# Patient Record
Sex: MALE | Race: WHITE
[De-identification: names, ages, dates, MRNs, and addresses within clinical notes are randomized per-mention and may not be internally consistent; named-entity substitution may affect disease eponyms.]

---

## 2017-12-10 ENCOUNTER — HOSPITAL ENCOUNTER (INPATIENT)
Dept: HOSPITAL 10 - E/R | Age: 22
LOS: 5 days | Discharge: HOME | DRG: 493 | End: 2017-12-15
Attending: INTERNAL MEDICINE | Admitting: INTERNAL MEDICINE
Payer: COMMERCIAL

## 2017-12-10 VITALS
WEIGHT: 267.64 LBS | BODY MASS INDEX: 36.25 KG/M2 | WEIGHT: 267.64 LBS | HEIGHT: 72 IN | HEIGHT: 72 IN | BODY MASS INDEX: 36.25 KG/M2

## 2017-12-10 VITALS — DIASTOLIC BLOOD PRESSURE: 69 MMHG | SYSTOLIC BLOOD PRESSURE: 129 MMHG | RESPIRATION RATE: 20 BRPM

## 2017-12-10 VITALS — TEMPERATURE: 98.6 F

## 2017-12-10 DIAGNOSIS — E87.0: ICD-10-CM
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
## 2017-12-10 DIAGNOSIS — E66.9: ICD-10-CM

## 2017-12-10 DIAGNOSIS — E83.32: ICD-10-CM

## 2017-12-10 DIAGNOSIS — R50.82: ICD-10-CM

## 2017-12-10 DIAGNOSIS — D72.829: ICD-10-CM

## 2017-12-10 DIAGNOSIS — S82.252A: Primary | ICD-10-CM

## 2017-12-10 LAB
ANION GAP SERPL CALC-SCNC: 17 MMOL/L (ref 8–16)
BASOPHILS # BLD AUTO: 0 10^3/UL (ref 0–0.1)
BASOPHILS NFR BLD: 0.3 % (ref 0–2)
BUN SERPL-MCNC: 8 MG/DL (ref 7–20)
CALCIUM SERPL-MCNC: 9.7 MG/DL (ref 8.4–10.2)
CHLORIDE SERPL-SCNC: 104 MMOL/L (ref 97–110)
CO2 SERPL-SCNC: 29 MMOL/L (ref 21–31)
CREAT SERPL-MCNC: 0.86 MG/DL (ref 0.61–1.24)
EOSINOPHIL # BLD: 0.1 10^3/UL (ref 0–0.5)
EOSINOPHIL NFR BLD: 1.3 % (ref 0–7)
ERYTHROCYTE [DISTWIDTH] IN BLOOD BY AUTOMATED COUNT: 12.3 % (ref 11.5–14.5)
GLUCOSE SERPL-MCNC: 90 MG/DL (ref 70–220)
HCT VFR BLD CALC: 43.9 % (ref 42–52)
HGB BLD-MCNC: 14.3 G/DL (ref 14–18)
INR PPP: 1
LYMPHOCYTES # BLD AUTO: 2.6 10^3/UL (ref 0.8–2.9)
LYMPHOCYTES NFR BLD AUTO: 29.7 % (ref 15–51)
MCH RBC QN AUTO: 29.5 PG (ref 29–33)
MCHC RBC AUTO-ENTMCNC: 32.6 G/DL (ref 32–37)
MCV RBC AUTO: 90.7 FL (ref 82–101)
MONOCYTES # BLD: 0.6 10^3/UL (ref 0.3–0.9)
MONOCYTES NFR BLD: 7.3 % (ref 0–11)
NEUTROPHILS # BLD: 5.4 10^3/UL (ref 1.6–7.5)
NEUTROPHILS NFR BLD AUTO: 61.1 % (ref 39–77)
NRBC # BLD MANUAL: 0 10^3/UL (ref 0–0)
NRBC BLD AUTO-RTO: 0 /100WBC (ref 0–0)
PLATELET # BLD: 444 10^3/UL (ref 140–415)
PMV BLD AUTO: 10.1 FL (ref 7.4–10.4)
POTASSIUM SERPL-SCNC: 4.2 MMOL/L (ref 3.5–5.1)
PROTHROMBIN TIME: 13.3 SEC (ref 11.9–14.9)
PT RATIO: 1
RBC # BLD AUTO: 4.84 10^6/UL (ref 4.7–6.1)
SODIUM SERPL-SCNC: 146 MMOL/L (ref 135–144)
WBC # BLD AUTO: 8.8 10^3/UL (ref 4.8–10.8)

## 2017-12-10 PROCEDURE — 97110 THERAPEUTIC EXERCISES: CPT

## 2017-12-10 PROCEDURE — 73700 CT LOWER EXTREMITY W/O DYE: CPT

## 2017-12-10 PROCEDURE — 81001 URINALYSIS AUTO W/SCOPE: CPT

## 2017-12-10 PROCEDURE — 73560 X-RAY EXAM OF KNEE 1 OR 2: CPT

## 2017-12-10 PROCEDURE — 73610 X-RAY EXAM OF ANKLE: CPT

## 2017-12-10 PROCEDURE — 84100 ASSAY OF PHOSPHORUS: CPT

## 2017-12-10 PROCEDURE — 97530 THERAPEUTIC ACTIVITIES: CPT

## 2017-12-10 PROCEDURE — 85610 PROTHROMBIN TIME: CPT

## 2017-12-10 PROCEDURE — 83735 ASSAY OF MAGNESIUM: CPT

## 2017-12-10 PROCEDURE — 80048 BASIC METABOLIC PNL TOTAL CA: CPT

## 2017-12-10 PROCEDURE — 87040 BLOOD CULTURE FOR BACTERIA: CPT

## 2017-12-10 PROCEDURE — 97542 WHEELCHAIR MNGMENT TRAINING: CPT

## 2017-12-10 PROCEDURE — 97116 GAIT TRAINING THERAPY: CPT

## 2017-12-10 PROCEDURE — 85025 COMPLETE CBC W/AUTO DIFF WBC: CPT

## 2017-12-10 PROCEDURE — 96375 TX/PRO/DX INJ NEW DRUG ADDON: CPT

## 2017-12-10 PROCEDURE — 36415 COLL VENOUS BLD VENIPUNCTURE: CPT

## 2017-12-10 PROCEDURE — 82306 VITAMIN D 25 HYDROXY: CPT

## 2017-12-10 PROCEDURE — 90686 IIV4 VACC NO PRSV 0.5 ML IM: CPT

## 2017-12-10 PROCEDURE — 96374 THER/PROPH/DIAG INJ IV PUSH: CPT

## 2017-12-10 PROCEDURE — 80053 COMPREHEN METABOLIC PANEL: CPT

## 2017-12-10 PROCEDURE — 97162 PT EVAL MOD COMPLEX 30 MIN: CPT

## 2017-12-10 PROCEDURE — 71010: CPT

## 2017-12-10 NOTE — RADRPT
PROCEDURE: CT LEFT ANKLE

 

CLINICAL INDICATION:  Injury. Fracture.. 

 

TECHNIQUE:   CT scan of the left ankle was performed on a multi -slice scanner.  No IV contrast was 
administered.  Coronal and sagittal  reformatted images were obtained from the axial source images. 
The total exam DLP equals 793.33 mGy-cm. The CDTI volume was 18.42 mGy.

 

One or more of the following dose reduction techniques were used:

 

- Automated exposure control.

- Adjustment of the mA and/or kV according to patient size .

- Use of iterative reconstruction technique. 

Images were reviewed on a high-resolution PACS workstation.    

 

DICOM images are available.

 

COMPARISON:   None. 

 

FINDINGS:

 

There is a comminuted intra-articular displaced left distal tibial fracture. There is a vertical spl
it component with the separation of 2 cm seen on sagittal image number 77. The posterior aspect of t
he tibia is displaced 16 mm posteriorly. There is a 2.6 cm butterfly fragment medially. There are in
tra-articular fragments. No fibular component is identified. No evidence for talar dome osteochondra
l defect. No evidence for calcaneal fracture. No evidence for midfoot or forefoot fracture. There is
 surrounding soft tissue swelling. 

 

 

IMPRESSION:

 

1.  Comminuted intra-articular displaced left distal tibial fracture with posterior displacement of 
the majority of the articular surface.

2.  There are intra-articular bony fragments.

 

RPTAT: XX

_____________________________________________ 

.Nick Garcia MD, MD           Date    Time 

Electronically viewed and signed by .Nick Garcia MD, MD on 12/10/2017 18:46 

 

D:  12/10/2017 18:46  T:  12/10/2017 18:46

.T/

## 2017-12-10 NOTE — ERD
ER Documentation


Chief Complaint


Chief Complaint


Sent from MD for eval and possible admission





HPI


22-year-old man with a history of recent left ankle fracture referred here by 

his orthopedic surgeon for further imaging and possible surgical intervention.  

He fractured his left ankle about 3 weeks ago, it has been in a splint since 

then.  Patient describes mild discomfort to the left ankle, no paresis or 

paresthesias no discoloration of the toes.





ROS


All systems reviewed and are negative except as per history of present illness.





Medications


Home Meds


Reported Medications


Hydrocodone Bit-Acetaminophen (Hydrocodone Bit-APAP) 5-325MG Tablet, 1 TAB PO 

Q6H Y for PAIN, TAB


   12/10/17


Ibuprofen* (Ibuprofen*) 600 Mg Tablet, 600 MG PO Q6 Y for PAIN, TAB


   12/10/17





Allergies


Allergies:  


Coded Allergies:  


     No Known Allergy (Unverified , 12/10/17)





PMhx/Soc


Recent left ankle fracture


Medical and Surgical Hx:  pt denies Medical Hx, pt denies Surgical Hx


Hx Alcohol Use:  No


Hx Substance Use:  No


Hx Tobacco Use:  No


Smoking Status:  Never smoker





FmHx


Family History:  No diabetes





Physical Exam


Vitals





Vital Signs








  Date Time  Temp Pulse Resp B/P Pulse Ox O2 Delivery O2 Flow Rate FiO2


 


12/10/17 15:48 98.9 117 20 140/86 97   








Physical Exam


GENERAL: Well-developed, well-nourished, well-hydrated, in no apparent distress

, looks nontoxic in appearance


HEENT: Moist mucous membranes, pink conjunctiva, no cervical spine tenderness 

or step-off deformities, no goiter, no jaundice or icterus, extraocular 

movements intact without pain. No submandibular induration, and no pharyngeal 

erythema


NEURO: Alert and oriented 3, cranial nerves II through XII intact bilaterally, 

pupils equal round reactive to light, no focal deficits or facial asymmetry, 

sensation intact distally Strength 5/5 in upper and lower extremities 

bilaterally


CARDIAC: Regular rate and rhythm, no murmurs rubs or gallops


LUNGS: Clear bilaterally no wheezing crackles or stridor


ABDOMEN: Soft nontender, no guarding, no rigidity, no rebound, no psoas sign no 

obturator sign. Normoactive bowel sounds


SKIN: Warm and dry to touch, no abrasions, contusions, or hematomas, no 

lacerations, no ecchymosis, no target lesions, and without ulcers


EXTREMITIES: No clubbing cyanosis or edema, calves are bilaterally symmetrical, 

no Homans sign, no popliteal cord sign. Distal pulses equal and bilateral


PSYCH: Normal affect without agitation or irritability


Result Diagram:  


12/10/17 1720                                                                  

              12/10/17 1720





Results 24 hrs





 Laboratory Tests








Test


  12/10/17


17:20


 


White Blood Count 8.810^3/ul 


 


Red Blood Count 4.8410^6/ul 


 


Hemoglobin 14.3g/dl 


 


Hematocrit 43.9% 


 


Mean Corpuscular Volume 90.7fl 


 


Mean Corpuscular Hemoglobin 29.5pg 


 


Mean Corpuscular Hemoglobin


Concent 32.6g/dl 


 


 


Red Cell Distribution Width 12.3% 


 


Platelet Count 81414^3/UL 


 


Mean Platelet Volume 10.1fl 


 


Neutrophils % 61.1% 


 


Lymphocytes % 29.7% 


 


Monocytes % 7.3% 


 


Eosinophils % 1.3% 


 


Basophils % 0.3% 


 


Nucleated Red Blood Cells % 0.0/100WBC 


 


Neutrophils # 5.410^3/ul 


 


Lymphocytes # 2.610^3/ul 


 


Monocytes # 0.610^3/ul 


 


Eosinophils # 0.110^3/ul 


 


Basophils # 0.010^3/ul 


 


Nucleated Red Blood Cells # 0.010^3/ul 


 


Prothrombin Time 13.3Sec 


 


Prothrombin Time Ratio 1.0 


 


INR International Normalized


Ratio 1.00 


 


 


Sodium Level 146mmol/L 


 


Potassium Level 4.2mmol/L 


 


Chloride Level 104mmol/L 


 


Carbon Dioxide Level 29mmol/L 


 


Anion Gap 17 


 


Blood Urea Nitrogen 8mg/dl 


 


Creatinine 0.86mg/dl 


 


Glucose Level 90mg/dl 


 


Calcium Level 9.7mg/dl 








 Current Medications








 Medications


  (Trade)  Dose


 Ordered  Sig/Sp


 Route


 PRN Reason  Start Time


 Stop Time Status Last Admin


Dose Admin


 


 Sodium Chloride


  (NS)  1,000 ml @ 


 1,000 mls/hr  Q1H STAT


 IV


   12/10/17 17:09


 12/10/17 18:08 DC 12/10/17 17:29


 


 


 Morphine Sulfate


  (morphine)  4 mg  ONCE  STAT


 IV


   12/10/17 17:09


 12/10/17 17:12 DC 12/10/17 17:29


 


 


 Ondansetron HCl


  (Zofran Inj)  4 mg  ONCE  STAT


 IV


   12/10/17 17:09


 12/10/17 17:12 DC 12/10/17 17:29


 











Procedures/MDM


IV line was established patient was placed on cardiac monitor rhythm strip 

revealed a sinus rhythm at about 80 bpm with upright P and T waves.  Patient 

was afebrile





I administered 1 L normal saline intravenously, morphine 4 mg IV, Zofran 4 mg 

IV.





CT scan of the left ankle was performed IMPRESSION:


 


1.  Comminuted intra-articular displaced left distal tibial fracture with 

posterior displacement of the majority of the articular surface.


2.  There are intra-articular bony fragments.





CBC and electrolytes are normal, coagulation profile was normal.





I spoke to the patient's orthopedic surgeon, who recommended admission and 

n.p.o. after midnight for surgical intervention tomorrow morning.





Patient to be admitted to [facility] for continued medical management and 

orthopedic surgical intervention.





Departure


Diagnosis:  


 Primary Impression:  


 Closed left ankle fracture


 Encounter type:  initial encounter  Qualified Code:  S82.892A - Closed 

fracture of left ankle, initial encounter


Condition:  DWAYNE Fernandez MD Dec 10, 2017 17:23

## 2017-12-11 VITALS — SYSTOLIC BLOOD PRESSURE: 119 MMHG | DIASTOLIC BLOOD PRESSURE: 71 MMHG | RESPIRATION RATE: 20 BRPM | HEART RATE: 102 BPM

## 2017-12-11 VITALS — DIASTOLIC BLOOD PRESSURE: 66 MMHG | RESPIRATION RATE: 21 BRPM | HEART RATE: 104 BPM | SYSTOLIC BLOOD PRESSURE: 127 MMHG

## 2017-12-11 VITALS — DIASTOLIC BLOOD PRESSURE: 68 MMHG | HEART RATE: 108 BPM | RESPIRATION RATE: 21 BRPM | SYSTOLIC BLOOD PRESSURE: 121 MMHG

## 2017-12-11 VITALS — RESPIRATION RATE: 24 BRPM | SYSTOLIC BLOOD PRESSURE: 153 MMHG | HEART RATE: 118 BPM | DIASTOLIC BLOOD PRESSURE: 75 MMHG

## 2017-12-11 VITALS — DIASTOLIC BLOOD PRESSURE: 72 MMHG | HEART RATE: 116 BPM | RESPIRATION RATE: 23 BRPM | SYSTOLIC BLOOD PRESSURE: 134 MMHG

## 2017-12-11 VITALS — RESPIRATION RATE: 20 BRPM | DIASTOLIC BLOOD PRESSURE: 70 MMHG | HEART RATE: 110 BPM | SYSTOLIC BLOOD PRESSURE: 133 MMHG

## 2017-12-11 VITALS — HEART RATE: 108 BPM | SYSTOLIC BLOOD PRESSURE: 136 MMHG | RESPIRATION RATE: 21 BRPM | DIASTOLIC BLOOD PRESSURE: 64 MMHG

## 2017-12-11 VITALS — SYSTOLIC BLOOD PRESSURE: 122 MMHG | RESPIRATION RATE: 20 BRPM | DIASTOLIC BLOOD PRESSURE: 70 MMHG | HEART RATE: 104 BPM

## 2017-12-11 VITALS — RESPIRATION RATE: 20 BRPM | SYSTOLIC BLOOD PRESSURE: 138 MMHG | DIASTOLIC BLOOD PRESSURE: 74 MMHG | HEART RATE: 110 BPM

## 2017-12-11 VITALS — SYSTOLIC BLOOD PRESSURE: 117 MMHG | HEART RATE: 102 BPM | DIASTOLIC BLOOD PRESSURE: 72 MMHG | RESPIRATION RATE: 21 BRPM

## 2017-12-11 VITALS — HEART RATE: 110 BPM | RESPIRATION RATE: 24 BRPM | DIASTOLIC BLOOD PRESSURE: 73 MMHG | SYSTOLIC BLOOD PRESSURE: 148 MMHG

## 2017-12-11 VITALS — SYSTOLIC BLOOD PRESSURE: 120 MMHG | DIASTOLIC BLOOD PRESSURE: 68 MMHG | HEART RATE: 108 BPM | RESPIRATION RATE: 24 BRPM

## 2017-12-11 VITALS — RESPIRATION RATE: 14 BRPM | DIASTOLIC BLOOD PRESSURE: 65 MMHG | SYSTOLIC BLOOD PRESSURE: 112 MMHG

## 2017-12-11 VITALS — DIASTOLIC BLOOD PRESSURE: 69 MMHG | SYSTOLIC BLOOD PRESSURE: 127 MMHG | HEART RATE: 104 BPM | RESPIRATION RATE: 20 BRPM

## 2017-12-11 VITALS — DIASTOLIC BLOOD PRESSURE: 73 MMHG | RESPIRATION RATE: 19 BRPM | SYSTOLIC BLOOD PRESSURE: 118 MMHG | HEART RATE: 101 BPM

## 2017-12-11 VITALS — SYSTOLIC BLOOD PRESSURE: 114 MMHG | DIASTOLIC BLOOD PRESSURE: 65 MMHG | RESPIRATION RATE: 16 BRPM

## 2017-12-11 VITALS — SYSTOLIC BLOOD PRESSURE: 118 MMHG | DIASTOLIC BLOOD PRESSURE: 68 MMHG | RESPIRATION RATE: 20 BRPM

## 2017-12-11 VITALS — RESPIRATION RATE: 20 BRPM | DIASTOLIC BLOOD PRESSURE: 72 MMHG | SYSTOLIC BLOOD PRESSURE: 129 MMHG

## 2017-12-11 LAB
ALBUMIN SERPL-MCNC: 3.5 G/DL (ref 3.3–4.9)
ALBUMIN/GLOB SERPL: 1.12 {RATIO}
ALP SERPL-CCNC: 77 IU/L (ref 42–121)
ALT SERPL-CCNC: 52 IU/L (ref 13–69)
ANION GAP SERPL CALC-SCNC: 13 MMOL/L (ref 8–16)
AST SERPL-CCNC: 22 IU/L (ref 15–46)
BASOPHILS # BLD AUTO: 0 10^3/UL (ref 0–0.1)
BASOPHILS NFR BLD: 0.4 % (ref 0–2)
BILIRUB DIRECT SERPL-MCNC: 0 MG/DL (ref 0–0.2)
BILIRUB SERPL-MCNC: 0.4 MG/DL (ref 0.2–1.3)
BUN SERPL-MCNC: 6 MG/DL (ref 7–20)
CALCIUM SERPL-MCNC: 9.3 MG/DL (ref 8.4–10.2)
CHLORIDE SERPL-SCNC: 106 MMOL/L (ref 97–110)
CO2 SERPL-SCNC: 28 MMOL/L (ref 21–31)
CREAT SERPL-MCNC: 0.84 MG/DL (ref 0.61–1.24)
EOSINOPHIL # BLD: 0.2 10^3/UL (ref 0–0.5)
EOSINOPHIL NFR BLD: 2.4 % (ref 0–7)
ERYTHROCYTE [DISTWIDTH] IN BLOOD BY AUTOMATED COUNT: 12.1 % (ref 11.5–14.5)
GLOBULIN SER-MCNC: 3.1 G/DL (ref 1.3–3.2)
GLUCOSE SERPL-MCNC: 103 MG/DL (ref 70–220)
HCT VFR BLD CALC: 38.9 % (ref 42–52)
HGB BLD-MCNC: 12.6 G/DL (ref 14–18)
LYMPHOCYTES # BLD AUTO: 4 10^3/UL (ref 0.8–2.9)
LYMPHOCYTES NFR BLD AUTO: 47.9 % (ref 15–51)
MAGNESIUM SERPL-MCNC: 2 MG/DL (ref 1.7–2.5)
MCH RBC QN AUTO: 29.5 PG (ref 29–33)
MCHC RBC AUTO-ENTMCNC: 32.4 G/DL (ref 32–37)
MCV RBC AUTO: 91.1 FL (ref 82–101)
MONOCYTES # BLD: 0.7 10^3/UL (ref 0.3–0.9)
MONOCYTES NFR BLD: 8 % (ref 0–11)
NEUTROPHILS # BLD: 3.5 10^3/UL (ref 1.6–7.5)
NEUTROPHILS NFR BLD AUTO: 40.9 % (ref 39–77)
NRBC # BLD MANUAL: 0 10^3/UL (ref 0–0)
NRBC BLD AUTO-RTO: 0 /100WBC (ref 0–0)
PHOSPHATE SERPL-MCNC: 4.7 MG/DL (ref 2.5–4.9)
PLATELET # BLD: 354 10^3/UL (ref 140–415)
PMV BLD AUTO: 10.4 FL (ref 7.4–10.4)
POTASSIUM SERPL-SCNC: 3.8 MMOL/L (ref 3.5–5.1)
PROT SERPL-MCNC: 6.6 G/DL (ref 6.1–8.1)
RBC # BLD AUTO: 4.27 10^6/UL (ref 4.7–6.1)
SODIUM SERPL-SCNC: 143 MMOL/L (ref 135–144)
WBC # BLD AUTO: 8.4 10^3/UL (ref 4.8–10.8)

## 2017-12-11 PROCEDURE — 0QSH04Z REPOSITION LEFT TIBIA WITH INTERNAL FIXATION DEVICE, OPEN APPROACH: ICD-10-PCS | Performed by: ORTHOPAEDIC SURGERY

## 2017-12-11 RX ADMIN — FOLIC ACID SCH MLS/HR: 5 INJECTION, SOLUTION INTRAMUSCULAR; INTRAVENOUS; SUBCUTANEOUS at 00:23

## 2017-12-11 RX ADMIN — FOLIC ACID SCH MLS/HR: 5 INJECTION, SOLUTION INTRAMUSCULAR; INTRAVENOUS; SUBCUTANEOUS at 16:18

## 2017-12-11 RX ADMIN — DOCUSATE SODIUM AND SENNOSIDES SCH TAB: 8.6; 5 TABLET, FILM COATED ORAL at 20:35

## 2017-12-11 RX ADMIN — MORPHINE SULFATE SCH MG: 1 INJECTION, SOLUTION INTRAVENOUS at 15:58

## 2017-12-11 RX ADMIN — FOLIC ACID SCH MLS/HR: 5 INJECTION, SOLUTION INTRAMUSCULAR; INTRAVENOUS; SUBCUTANEOUS at 10:00

## 2017-12-11 NOTE — OPR
Date/Time of Note


Date/Time of Note


DATE: 12/11/17 


TIME: 15:29





Operative Report


Procedure Date:  Dec 11, 2017


Preoperative Diagnosis


Left ankle fracture dislocation with displaced distal tibial pilon fracture


Postoperative Diagnosis


Left ankle fracture dislocation with displaced distal tibial pilon fracture


Operation/Procedure Performed


Left ankle fracture dislocation displaced distal tibial pilon fracture open 

reduction internal fixation with allograft


Application of external fixator followed by removal of ex- fix


Surgeon


Brian Zhong MD


Assistant


None


Anesthesia Type:  general


Tourniquet Time:  130 min at 250 mmHg


Estimated Blood Loss:  50 - 100 ml's


Transfusion


   none


Specimen


none


Grafts/Implants


[place] Distal tibial pilon plate and screws with medial plate and screws


[employer] Elite Bone Graft


Complications


none


Pt Condition Post Procedure:  stable


Disposition:  PACU


Indications


Patient is a 22-year-old gentleman who sustained a left ankle fracture 

dislocation approximately 3 and half weeks ago.  He presented to my office 2 

days ago and his ankle was completely dislocated at the time of presentation if 

he arrived in a posterior mold splint.  He reported that the delay in 

presentation was due to insurance authorization issues.  Given the significant 

dislocation I recommend patient go to emergency room so we could proceed with 

urgent reduction and fixation.  Patient was admitted and given the extent of 

the fracture dislocation patient required open reduction internal fixation of 

the distal tibial pilon and fibular fracture.


Procedure Description


Risk Note: Patient was explained the risks and benefits of surgery and the 

patient's native language including not limited to infection, bleeding, injury 

to blood vessels, nerves, ligaments or tendons.  Risks of anesthesia, deep vein 

thrombosis and need for reduce future surgery.  Patient acknowledged these risk 

by signing the surgical consent form.





Modifier 22 note: Given the extensive length of time the patient's ankle was in 

a dislocated position this required an extraordinary amount of technical skill 

and time in the operating room to both obtain adequate length alignment and 

rotation.  This required applying an external fixator device in order to reduce 

the fracture and the pain length and alignment and reduction of the fracture.  

The external fixator had to be removed and the case but this added a 

significant time to the case and thus made the case extensively more 

challenging and technically difficult due to the length of time of the fracture 

being in the mal-reduced position.  Thus the case should be given a modifier 22 

compensation.








Operative note: Patient was met in the preoperative area and the correct 

operative extremity was confirmed with the patient and consent and marked 

accordingly.  Patient was then brought to the operative theater placed supine 

on operative table given preoperative antibiotics.


Patient was prepped and draped in normal sterile fashion.  A timeout was taken 

in all parties in the room agreed direct patient extremity and procedure.  

Attention was initially placed to reduce in the fracture dislocation given that 

the talus was posteriorly displaced on the tibia.  A external fixator was 

placed in a delta frame position with 2 Schanz pins proximally on the tibia 

followed by a calcaneal pin placed medial to lateral at the calcaneus.  The 

fracture was then distracted out to length using the external fixator device.  

This is confirmed on both AP lateral and oblique fluoroscopic position.  

Tourniquet was then brought up incision was made in the anterior lateral 

approach and care was taken to avoid any injury to the neurovascular 

structures.  Fracture was identified along the anterior lateral aspect of the 

distal fibula and there is extensive comminution and osteopenia seen at this 

fracture site.  The fracture was reduced and held in position with K wires.  An 

anterior lateral distal tibial pilon plate was then placed compressing the 

fracture and a lag screw is in place free of the fracture plate and further 

reduce the fracture fragment.  The [employer] Elite allograft was also placed in 

the site of bone loss to further obtain allograft assistance in the healing of 

the fracture.  The final fixation was then placed with screws both proximal and 

locking screws distally along the anterior surface of the distal tibia.  It was 

confirmed that the screws were not in the joint shown on both lateral and AP 

position.  Once the anterior lateral leg was placed and there is shown to be 

excellent reduction of the articular surface attention was then turned to the 

medial aspect of the ankle incision was made along the medial aspect and 

decision was performed fracture was identified hematoma was removed and 

fracture was further reduced and held in position with K wires and then fixated 

with the medial tibial plate.  The fracture was reduced and held in position 

with lag screw fixation and locking screw nonlocking screw fixation.  Once the 

fracture was gently reduced and with the AP, lateral and oblique position all 

wounds irrigated thoroughly and closed in layers with 0 Vicryl followed by 2-0 

Vicryl followed by 3-0 Vicryl and then 3-0 nylon in a running vertical mattress 

fashion.  All wounds are dressed Xeroform, antibiotic ointment 4 x 4's and 

placed in a well-padded short leg splint.  I then the case all lunges and 

needle counts were correct.  I then the case the pulses were 2+ and patient was 

taken to the PACU in stable condition.











BRIAN ZHONG MD Dec 11, 2017 15:29

## 2017-12-11 NOTE — HP
Date/Time of Note


Date/Time of Note


DATE: 12/11/17 


TIME: 05:18





Assessment/Plan


VTE Prophylaxis


VTE Prophylaxis Intervention:  SCD's





Lines/Catheters


IV Catheter Type (from Nrs):  Saline Lock


Urinary Cath still in place:  No





Assessment/Plan


Assessment/Plan





1.  Left distal tibial fracture


-Keep n.p.o. for possible surgical intervention in the morning


-Pain management





2.  Mild hypernatremia


-Check repeat a.m. lab





3.  Obesity, with a BMI of 36


- weight reduction advised





HPI/ROS


Admit Date/Time


Admit Date/Time


Dec 10, 2017 at 18:16





Hx of Present Illness





This is a 22-year-old man with no significant chronic medical history who is 

admitted for orthopedic intervention.  Patient fractured his left ankle about 3 

weeks ago and has been on a cast since then.  He was sent by his orthopedic 

surgeon for possible surgery in a.m. patient complains of some pain on left 

lower extremity otherwise denied shortness of breath, chest pain, fever/chills, 

nausea or vomiting. 


When he initially presented to the ER, BP was 140/86 with a heart rate of 117.  

Tachycardia has since resolved after a liter of NS and morphine.  Labs shows 

sodium of 146 otherwise CBC and BMP are within acceptable range.  CT of the 

left lower extremity showed comminuted intra-articular displaced left distal 

tibial fracture with posterior displacement of the majority of the articular 

surface and intra-articular bony fragments.





PMH/Family/Social


Social History


Smoking Status:  Never smoker





Exam/Review of Systems


Vital Signs


Vitals





 Vital Signs








  Date Time  Temp Pulse Resp B/P Pulse Ox O2 Delivery O2 Flow Rate FiO2


 


12/11/17 01:58 98.0 64 20 118/68 98   


 


12/10/17 20:30      Room Air  














 Intake and Output   


 


 12/10/17 12/10/17 12/11/17





 14:59 22:59 06:59


 


Intake Total   450 ml


 


Balance   450 ml











Exam


Constitutional:  alert, oriented, well developed


Head:  atraumatic, normocephalic


Eyes:  EOMI, PERRL


Respiratory:  clear to auscultation, normal air movement


Cardiovascular:  nl pulses, regular rate and rhythm


Gastrointestinal:  non-tender, soft


Extremities:  other (Left lower extremity in a splint)





Labs


Result Diagram:  


12/10/17 1720                                                                  

              12/10/17 1720








Medications


Medications





 Current Medications


Dextrose/Sodium Chloride (D5-1/2ns) 1,000 ml @  100 mls/hr Q10H IV  Last 

administered on 12/11/17at 00:23; Admin Dose 100 MLS/HR;  Start 12/11/17 at 00:

00


Morphine Sulfate (morphine) 4 mg Q4H  PRN IV PAIN;  Start 12/10/17 at 22:00


Ondansetron HCl (Zofran Inj) 4 mg Q6H  PRN IV NAUSEA AND/OR VOMITING;  Start 12/

10/17 at 22:00


Influenza Virus Vaccine (Fluzone) 0.5 ml ONCE ONCE IM* ;  Start 12/12/17 at 09:

00;  Stop 12/12/17 at 09:01











NATHALIE SUGGS MD Dec 11, 2017 05:21

## 2017-12-11 NOTE — PN
Date/Time of Note


Date/Time of Note


DATE: 12/11/17 


TIME: 08:59





Assessment/Plan


VTE Prophylaxis


VTE Prophylaxis Intervention:  SCD's





Lines/Catheters


IV Catheter Type (from Nrs):  Saline Lock


Urinary Cath still in place:  No





Assessment/Plan


Assessment/Plan





1.  Left distal tibial fracture


- emergent surgery ORIF today


-Pain management





2.  Mild hypernatremia: resolved





3.  Obesity, with a BMI of 36


- weight reduction advise reinforced





Resume supportive care post op / f/u surgical findings





Subjective


24 Hr Interval Summary


Free Text/Dictation


patient doing well, states pain is well controlled





Exam/Review of Systems


Vital Signs


Vitals





 Vital Signs








  Date Time  Temp Pulse Resp B/P Pulse Ox O2 Delivery O2 Flow Rate FiO2


 


12/11/17 07:22 97.9 71 14 112/65 99   


 


12/10/17 20:30      Room Air  














 Intake and Output   


 


 12/10/17 12/10/17 12/11/17





 15:00 23:00 07:00


 


Intake Total   450 ml


 


Balance   450 ml











Exam


Constitutional:  alert, oriented, well developed


Head:  atraumatic, normocephalic


Eyes:  EOMI, PERRL


Respiratory:  clear to auscultation, normal air movement


Cardiovascular:  nl pulses, regular rate and rhythm


Gastrointestinal:  non-tender, soft


Extremities:  other (Left lower extremity in a splint)





Results


Result Diagram:  


12/11/17 0505                                                                  

              12/11/17 0505





Results 24 hrs





Laboratory Tests








Test


  12/10/17


17:20 12/11/17


05:05


 


White Blood Count 8.8   8.4  


 


Red Blood Count 4.84   4.27  L


 


Hemoglobin 14.3   12.6  L


 


Hematocrit 43.9   38.9  L


 


Mean Corpuscular Volume 90.7   91.1  


 


Mean Corpuscular Hemoglobin 29.5   29.5  


 


Mean Corpuscular Hemoglobin


Concent 32.6  


  32.4  


 


 


Red Cell Distribution Width 12.3   12.1  


 


Platelet Count 444  H 354  #


 


Mean Platelet Volume 10.1   10.4  


 


Neutrophils % 61.1   40.9  


 


Lymphocytes % 29.7   47.9  


 


Monocytes % 7.3   8.0  


 


Eosinophils % 1.3   2.4  


 


Basophils % 0.3   0.4  


 


Nucleated Red Blood Cells % 0.0   0.0  


 


Neutrophils # 5.4   3.5  


 


Lymphocytes # 2.6   4.0  H


 


Monocytes # 0.6   0.7  


 


Eosinophils # 0.1   0.2  


 


Basophils # 0.0   0.0  


 


Nucleated Red Blood Cells # 0.0   0.0  


 


Prothrombin Time 13.3   


 


Prothrombin Time Ratio 1.0   


 


INR International Normalized


Ratio 1.00  


  


 


 


Sodium Level 146  H 143  


 


Potassium Level 4.2   3.8  


 


Chloride Level 104   106  


 


Carbon Dioxide Level 29   28  


 


Anion Gap 17  H 13  


 


Blood Urea Nitrogen 8   6  L


 


Creatinine 0.86   0.84  


 


Glucose Level 90   103  


 


Calcium Level 9.7   9.3  


 


Phosphorus Level  4.7  


 


Magnesium Level  2.0  


 


Total Bilirubin  0.4  


 


Direct Bilirubin  0.00  


 


Indirect Bilirubin  0.4  


 


Aspartate Amino Transf


(AST/SGOT) 


  22  


 


 


Alanine Aminotransferase


(ALT/SGPT) 


  52  


 


 


Alkaline Phosphatase  77  


 


Total Protein  6.6  


 


Albumin  3.5  


 


Globulin  3.10  


 


Albumin/Globulin Ratio  1.12  











Medications


Medications





 Current Medications


Dextrose/Sodium Chloride (D5-1/2ns) 1,000 ml @  100 mls/hr Q10H IV  Last 

administered on 12/11/17at 00:23; Admin Dose 100 MLS/HR;  Start 12/11/17 at 00:

00


Morphine Sulfate (morphine) 4 mg Q4H  PRN IV PAIN;  Start 12/10/17 at 22:00


Ondansetron HCl (Zofran Inj) 4 mg Q6H  PRN IV NAUSEA AND/OR VOMITING;  Start 12/

10/17 at 22:00


Influenza Virus Vaccine 0.5 ml 0.5 ml ONCE ONCE IM* ;  Start 12/12/17 at 09:00;

  Stop 12/12/17 at 09:01


Cefazolin Sodium/ Dextrose (Ancef 2 Gm/50 ml (Pmx)) 50 ml @  100 mls/hr PRE-OP 

ONCE IVPB ;  Start 12/11/17 at 09:00;  Stop 12/11/17 at 09:29











WILLIE BAI Dec 11, 2017 09:00

## 2017-12-11 NOTE — SIPON
Date/Time of Note


Date/Time of Note


DATE: 12/11/17 


TIME: 15:29





Operative Report


Preoperative Diagnosis


Left ankle fracture dislocation with displaced distal tibial pilon fracture


Postoperative Diagnosis


Left ankle fracture dislocation with displaced distal tibial pilon fracture


Operation/Procedure Performed


Left ankle fracture dislocation displaced distal tibial pilon fracture open 

reduction internal fixation with allograft


Application of external fixator followed by removal of ex- fix


Surgeon


Brian Zhong MD


First assist


None


Anesthesia:  general


Estimated blood loss:  50 - 100 ml's


Transfusion Required


   none


Specimen


none


Grafts/Implants


see operative note


Complications


none











BRIAN ZHONG MD Dec 11, 2017 15:29

## 2017-12-11 NOTE — CONS
Date/Time of Note


Date/Time of Note


DATE: 12/11/17 


TIME: 08:02





Assessment/Plan


Assessment/Plan


Additional Assessment/Plan


23 yo male with displaced left distal tibial pilon fracture dislocation


- add on for emergent surgery ORIF today


- NPO


- IVF


- NWB to the LLE





-Yoana ZHONG MD





Consultation Date/Type/Reason


Admit Date/Time


Dec 10, 2017 at 18:16


Date of Consultation:  Dec 11, 2017


Type of Consultation:  Orthopedic Surgery


Referring Provider:  NATHALIE SUGGS MD





Hx of Present Illness


Patient is a 22 year old male who sustained a distal tibial pilon fracture 3.5 

weeks ago. Due to insurance issues patient does not present for surgery until 

now. He was admitted through the ER yesterday and ct scan shows displaced  

ankle fracture dislocation. Patient requires urgent surgical fixation. Pain is 

moderately controlled





Past Medical History


Medical History:  no pertinent history





Past Surgical History


Past Surgical Hx:  no surgical history





Family History


Significant Family History:  no pertinent family hx





Social History


Alcohol Use:  none


Smoking Status:  Never smoker


Drug Use:  none





Exam/Review of Systems


Vital Signs


Vitals





 Vital Signs








  Date Time  Temp Pulse Resp B/P Pulse Ox O2 Delivery O2 Flow Rate FiO2


 


12/11/17 07:22 97.9 71 14 112/65 99   


 


12/10/17 20:30      Room Air  














 Intake and Output   


 


 12/10/17 12/10/17 12/11/17





 15:00 23:00 07:00


 


Intake Total   450 ml


 


Balance   450 ml











Exam


Musculoskeletal:  other (LLE/ Splint intact, toes wiggle, sens intact to lt 

touch to the m/l/p with slightly decr to the fdws and dorsum but intact, cr 

brisk)





Results


Result Diagram:  


12/11/17 0505                                                                  

              12/11/17 0505





Results 24 hrs





Laboratory Tests








Test


  12/10/17


17:20 12/11/17


05:05


 


White Blood Count 8.8   8.4  


 


Red Blood Count 4.84   4.27  L


 


Hemoglobin 14.3   12.6  L


 


Hematocrit 43.9   38.9  L


 


Mean Corpuscular Volume 90.7   91.1  


 


Mean Corpuscular Hemoglobin 29.5   29.5  


 


Mean Corpuscular Hemoglobin


Concent 32.6  


  32.4  


 


 


Red Cell Distribution Width 12.3   12.1  


 


Platelet Count 444  H 354  #


 


Mean Platelet Volume 10.1   10.4  


 


Neutrophils % 61.1   40.9  


 


Lymphocytes % 29.7   47.9  


 


Monocytes % 7.3   8.0  


 


Eosinophils % 1.3   2.4  


 


Basophils % 0.3   0.4  


 


Nucleated Red Blood Cells % 0.0   0.0  


 


Neutrophils # 5.4   3.5  


 


Lymphocytes # 2.6   4.0  H


 


Monocytes # 0.6   0.7  


 


Eosinophils # 0.1   0.2  


 


Basophils # 0.0   0.0  


 


Nucleated Red Blood Cells # 0.0   0.0  


 


Prothrombin Time 13.3   


 


Prothrombin Time Ratio 1.0   


 


INR International Normalized


Ratio 1.00  


  


 


 


Sodium Level 146  H 143  


 


Potassium Level 4.2   3.8  


 


Chloride Level 104   106  


 


Carbon Dioxide Level 29   28  


 


Anion Gap 17  H 13  


 


Blood Urea Nitrogen 8   6  L


 


Creatinine 0.86   0.84  


 


Glucose Level 90   103  


 


Calcium Level 9.7   9.3  


 


Phosphorus Level  4.7  


 


Magnesium Level  2.0  


 


Total Bilirubin  0.4  


 


Direct Bilirubin  0.00  


 


Indirect Bilirubin  0.4  


 


Aspartate Amino Transf


(AST/SGOT) 


  22  


 


 


Alanine Aminotransferase


(ALT/SGPT) 


  52  


 


 


Alkaline Phosphatase  77  


 


Total Protein  6.6  


 


Albumin  3.5  


 


Globulin  3.10  


 


Albumin/Globulin Ratio  1.12  











Medications


Medications





 Current Medications


Dextrose/Sodium Chloride (D5-1/2ns) 1,000 ml @  100 mls/hr Q10H IV  Last 

administered on 12/11/17at 00:23; Admin Dose 100 MLS/HR;  Start 12/11/17 at 00:

00


Morphine Sulfate (morphine) 4 mg Q4H  PRN IV PAIN;  Start 12/10/17 at 22:00


Ondansetron HCl (Zofran Inj) 4 mg Q6H  PRN IV NAUSEA AND/OR VOMITING;  Start 12/

10/17 at 22:00


Influenza Virus Vaccine (Fluzone) 0.5 ml ONCE ONCE IM* ;  Start 12/12/17 at 09:

00;  Stop 12/12/17 at 09:01











SELENA ZHONG MD Dec 11, 2017 08:11

## 2017-12-12 VITALS — RESPIRATION RATE: 14 BRPM | DIASTOLIC BLOOD PRESSURE: 59 MMHG | SYSTOLIC BLOOD PRESSURE: 112 MMHG

## 2017-12-12 VITALS — RESPIRATION RATE: 14 BRPM | SYSTOLIC BLOOD PRESSURE: 139 MMHG | DIASTOLIC BLOOD PRESSURE: 88 MMHG

## 2017-12-12 VITALS — RESPIRATION RATE: 18 BRPM | SYSTOLIC BLOOD PRESSURE: 125 MMHG | DIASTOLIC BLOOD PRESSURE: 75 MMHG

## 2017-12-12 VITALS — DIASTOLIC BLOOD PRESSURE: 72 MMHG | RESPIRATION RATE: 14 BRPM | SYSTOLIC BLOOD PRESSURE: 132 MMHG

## 2017-12-12 LAB
ANION GAP SERPL CALC-SCNC: 13 MMOL/L (ref 8–16)
BASOPHILS # BLD AUTO: 0 10^3/UL (ref 0–0.1)
BASOPHILS NFR BLD: 0.2 % (ref 0–2)
BUN SERPL-MCNC: 5 MG/DL (ref 7–20)
CALCIUM SERPL-MCNC: 9.2 MG/DL (ref 8.4–10.2)
CHLORIDE SERPL-SCNC: 104 MMOL/L (ref 97–110)
CO2 SERPL-SCNC: 29 MMOL/L (ref 21–31)
CREAT SERPL-MCNC: 0.88 MG/DL (ref 0.61–1.24)
EOSINOPHIL # BLD: 0 10^3/UL (ref 0–0.5)
EOSINOPHIL NFR BLD: 0.1 % (ref 0–7)
ERYTHROCYTE [DISTWIDTH] IN BLOOD BY AUTOMATED COUNT: 12.2 % (ref 11.5–14.5)
GLUCOSE SERPL-MCNC: 113 MG/DL (ref 70–220)
HCT VFR BLD CALC: 36.8 % (ref 42–52)
HGB BLD-MCNC: 12.1 G/DL (ref 14–18)
LYMPHOCYTES # BLD AUTO: 2.1 10^3/UL (ref 0.8–2.9)
LYMPHOCYTES NFR BLD AUTO: 15.9 % (ref 15–51)
MAGNESIUM SERPL-MCNC: 1.9 MG/DL (ref 1.7–2.5)
MCH RBC QN AUTO: 29.8 PG (ref 29–33)
MCHC RBC AUTO-ENTMCNC: 32.9 G/DL (ref 32–37)
MCV RBC AUTO: 90.6 FL (ref 82–101)
MONOCYTES # BLD: 1.4 10^3/UL (ref 0.3–0.9)
MONOCYTES NFR BLD: 10.6 % (ref 0–11)
NEUTROPHILS # BLD: 9.6 10^3/UL (ref 1.6–7.5)
NEUTROPHILS NFR BLD AUTO: 72.8 % (ref 39–77)
NRBC # BLD MANUAL: 0 10^3/UL (ref 0–0)
NRBC BLD AUTO-RTO: 0 /100WBC (ref 0–0)
PLATELET # BLD: 375 10^3/UL (ref 140–415)
PMV BLD AUTO: 10.3 FL (ref 7.4–10.4)
POTASSIUM SERPL-SCNC: 4.6 MMOL/L (ref 3.5–5.1)
RBC # BLD AUTO: 4.06 10^6/UL (ref 4.7–6.1)
SODIUM SERPL-SCNC: 141 MMOL/L (ref 135–144)
WBC # BLD AUTO: 13.2 10^3/UL (ref 4.8–10.8)

## 2017-12-12 RX ADMIN — MORPHINE SULFATE SCH MG: 1 INJECTION, SOLUTION INTRAVENOUS at 11:29

## 2017-12-12 RX ADMIN — DOCUSATE SODIUM AND SENNOSIDES SCH TAB: 8.6; 5 TABLET, FILM COATED ORAL at 20:41

## 2017-12-12 RX ADMIN — DOCUSATE SODIUM AND SENNOSIDES SCH TAB: 8.6; 5 TABLET, FILM COATED ORAL at 08:30

## 2017-12-12 RX ADMIN — RIVAROXABAN SCH MG: 10 TABLET, FILM COATED ORAL at 17:23

## 2017-12-12 NOTE — RADRPT
PROCEDURE:   XR Left Ankle. 

 

CLINICAL INDICATION:  increased pain

 

TECHNIQUE:   AP, oblique and lateral views of the ankle were performed. 

 

COMPARISON:   12/11/2017 

 

FINDINGS:

Images are obtained through a fiberglass splint which limits fine detail.

Fixation plates are seen stabilizing the distal lateral tibia and distal medial tibia to include the
 medial malleolus. A comminuted medial malleolar fracture is present. No additional fractures are cl
early identified.

No acute fracture is identified.

The angle mortise is intact.

The soft tissues are unremarkable. 

 

 

 

IMPRESSION:

Fixation plates are seen stabilizing the distal lateral tibia and distal medial tibia to include the
 medial malleolus. A comminuted medial malleolar fracture is present.

 

 

RPTAT:AAJJ

_____________________________________________ 

Physician Mickey           Date    Time 

Electronically viewed and signed by Elmer Lou Physician on 12/12/2017 12:49 

 

D:  12/12/2017 12:49  T:  12/12/2017 12:49

KATE/

## 2017-12-12 NOTE — PN
Date/Time of Note


Date/Time of Note


DATE: 12/12/17 


TIME: 12:34





Assessment/Plan


Lines/Catheters


IV Catheter Type (from Nrsg):  Peripheral IV


Elder in Place (from Nrsg):  No





Assessment/Plan


Chief Complaint/Hosp Course


POD #1 s/p


Left ankle fracture dislocation displaced distal tibial pilon fracture open 

reduction internal fixation with allograft


Application of external fixator followed by removal of ex- fix





- NWB to the LLE


- PT to  GT


- DC PCA


- PO Pain meds


- Ok to DC when ok with Primary





E Pacheco


Problems:  





Subjective


24 Hr Interval Summary


Pain is better controlled patient denies any fevers chills nausea or vomiting.


Feeding:  advancing diet


Pain Control:  mild





Exam/Review of Systems


Vital Signs


Vitals





 Vital Signs








  Date Time  Temp Pulse Resp B/P Pulse Ox O2 Delivery O2 Flow Rate FiO2


 


12/15/17 14:00 99.6 103 17 144/83 97   











Results


Result Diagram:  


12/15/17 0537                                                                  

              12/15/17 0537














SELENA ZHONG MD Dec 12, 2017 12:34


Result Diagram:  


12/12/17 0453                                                                  

              12/12/17 0453














SELENA ZHONG MD Dec 12, 2017 12:34

## 2017-12-12 NOTE — PN
Date/Time of Note


Date/Time of Note


DATE: 12/12/17 


TIME: 10:58





Assessment/Plan


VTE Prophylaxis


VTE Prophylaxis Intervention:  other (xarelto)





Lines/Catheters


IV Catheter Type (from Nrs):  Peripheral IV


Urinary Cath still in place:  No





Assessment/Plan


Assessment/Plan


1.  Left distal tibial fracture


(Comminuted intra-articular displaced left distal tibial fracture with 

posterior displacement of the majority of the articular surface.)


- s/p emergent ORIF


-on PCA morphine pump for pain with oral percocets and IV cefazolin per ortho


-Will order XRs to ensure continued satisfactory alignment / also notify ortho





2.  Mild hypernatremia: resolved





3.  Obesity, with a BMI of 36


- weight reduction advise reinforced





4. leucocytosis : likely stress related





Resume supportive care post op





Subjective


24 Hr Interval Summary


Free Text/Dictation


Patient is c/o pain which he describes as a twisting sensation in his mid leg  

which started after PT. he was able to ambulate with crutches without bearing 

weight on R leg. 


The morphine PCA is not really helping.





Exam/Review of Systems


Vital Signs


Vitals





 Vital Signs








  Date Time  Temp Pulse Resp B/P Pulse Ox O2 Delivery O2 Flow Rate FiO2


 


12/12/17 07:22 98.9 87 14 112/59 94   


 


12/11/17 16:00      Room Air  


 


12/11/17 15:19       6.0 














 Intake and Output   


 


 12/11/17 12/11/17 12/12/17





 15:00 23:00 07:00


 


Intake Total 2150 ml 300 ml 525 ml


 


Output Total  100 ml 


 


Balance 2150 ml 200 ml 525 ml











Exam


Constitutional:  alert


Psych:  anxiety


Eyes:  PERRL


ENMT:  mucosa pink and moist


Neck:  supple


Respiratory:  clear to auscultation


Cardiovascular:  regular rate and rhythm, 


   No murmurs/extra sounds


Gastrointestinal:  bowel sounds, non-tender, soft


Musculoskeletal:  other (R knee in cast fro toes to just below knee. Area 

around R knee is non tender nor inflammed nor obviously displaced)


Neurological:  nl mental status


Skin:  No rash or lesions





Results


Result Diagram:  


12/12/17 0453                                                                  

              12/12/17 0453





Results 24 hrs





Laboratory Tests








Test


  12/12/17


04:53


 


White Blood Count 13.2  #H


 


Red Blood Count 4.06  L


 


Hemoglobin 12.1  L


 


Hematocrit 36.8  L


 


Mean Corpuscular Volume 90.6  


 


Mean Corpuscular Hemoglobin 29.8  


 


Mean Corpuscular Hemoglobin


Concent 32.9  


 


 


Red Cell Distribution Width 12.2  


 


Platelet Count 375  


 


Mean Platelet Volume 10.3  


 


Neutrophils % 72.8  


 


Lymphocytes % 15.9  


 


Monocytes % 10.6  


 


Eosinophils % 0.1  


 


Basophils % 0.2  


 


Nucleated Red Blood Cells % 0.0  


 


Neutrophils # 9.6  H


 


Lymphocytes # 2.1  


 


Monocytes # 1.4  H


 


Eosinophils # 0.0  


 


Basophils # 0.0  


 


Nucleated Red Blood Cells # 0.0  


 


Sodium Level 141  


 


Potassium Level 4.6  


 


Chloride Level 104  


 


Carbon Dioxide Level 29  


 


Anion Gap 13  


 


Blood Urea Nitrogen 5  L


 


Creatinine 0.88  


 


Glucose Level 113  


 


Calcium Level 9.2  


 


Magnesium Level 1.9  











Medications


Medications





 Current Medications


Dextrose/Sodium Chloride (D5-1/2ns) 1,000 ml @  50 mls/hr Q20H IV  Last 

administered on 12/11/17at 16:18; Admin Dose 100 MLS/HR;  Start 12/11/17 at 00:

00


Ondansetron HCl (Zofran Inj) 4 mg Q6H  PRN IV NAUSEA AND/OR VOMITING Last 

administered on 12/11/17at 16:41; Admin Dose 4 MG;  Start 12/10/17 at 22:00


Senna/Docusate Sodium (Senokot-S) 1 tab BID PO  Last administered on 12/12/17at 

08:30; Admin Dose 1 TAB;  Start 12/11/17 at 21:00


Magnesium Hydroxide (Milk Of Mag) 30 ml BID  PRN PO CONSTIPATION;  Start 12/11/ 17 at 16:00


Oxycodone/ Acetaminophen (Percocet (5/ 325)) 2 tab Q4H  PRN PO PAIN Last 

administered on 12/12/17at 10:24; Admin Dose 2 TAB;  Start 12/11/17 at 16:00


Morphine Sulfate (morphine) 5 mg Q4H  PRN IV PAIN LEVEL 7-10;  Start 12/11/17 

at 16:00


Ondansetron HCl (Zofran Inj) 4 mg Q4H  PRN IV NAUSEA AND/OR VOMITING Last 

administered on 12/11/17at 20:35; Admin Dose 4 MG;  Start 12/11/17 at 16:00


Diphenhydramine HCl (Benadryl) 25 mg Q4H  PRN PO ITCHING;  Start 12/11/17 at 16:

00


Morphine Sulfate 1.5  MG DOSE 10 ... Q4PCA IV  Last administered on 12/11/17at 

15:58; Admin Dose 30 MG;  Start 12/11/17 at 16:00


Cefazolin Sodium/ Dextrose (Ancef 2 Gm/50 ml (Pmx)) 50 ml @  100 mls/hr Q8H 

IVPB  Last administered on 12/12/17at 08:30; Admin Dose 100 MLS/HR;  Start 12/11 /17 at 16:30;  Stop 12/13/17 at 08:59











WILLIE BAI Dec 12, 2017 11:07

## 2017-12-12 NOTE — RADRPT
PROCEDURE:   Left knee x-ray 

 

CLINICAL INDICATION:  increased pain

 

TECHNIQUE:   AP and lateral views of the knee were obtained. 

 

COMPARISON:   None 

 

FINDINGS:

There is normal mineralization.  

No fracture is identified.

Lucencies are seen in the proximal tibial diaphysis, likely related to previous hardware placement.

There are no significant degenerative changes.  

There is no joint effusion. 

There is no significant soft tissue swelling. 

 

IMPRESSION:

 

1.  No acute abnormalities are identified.

2.  Lucencies are seen in the proximal tibial diaphysis, likely related to previous hardware placeme
nt.

 

 

RPTAT:AAJJ

_____________________________________________ 

Physician Mickey           Date    Time 

Electronically viewed and signed by Physician Mickey on 12/12/2017 12:46 

 

D:  12/12/2017 12:46  T:  12/12/2017 12:46

/

## 2017-12-13 VITALS — RESPIRATION RATE: 20 BRPM | DIASTOLIC BLOOD PRESSURE: 91 MMHG | SYSTOLIC BLOOD PRESSURE: 158 MMHG

## 2017-12-13 VITALS — RESPIRATION RATE: 18 BRPM | DIASTOLIC BLOOD PRESSURE: 82 MMHG | SYSTOLIC BLOOD PRESSURE: 136 MMHG

## 2017-12-13 VITALS — RESPIRATION RATE: 14 BRPM | SYSTOLIC BLOOD PRESSURE: 131 MMHG | DIASTOLIC BLOOD PRESSURE: 75 MMHG

## 2017-12-13 VITALS — SYSTOLIC BLOOD PRESSURE: 133 MMHG | DIASTOLIC BLOOD PRESSURE: 71 MMHG | RESPIRATION RATE: 18 BRPM

## 2017-12-13 LAB
ADD UMIC: YES
ANION GAP SERPL CALC-SCNC: 14 MMOL/L (ref 8–16)
BASOPHILS # BLD AUTO: 0 10^3/UL (ref 0–0.1)
BASOPHILS NFR BLD: 0.1 % (ref 0–2)
BUN SERPL-MCNC: 6 MG/DL (ref 7–20)
CALCIUM SERPL-MCNC: 8.6 MG/DL (ref 8.4–10.2)
CHLORIDE SERPL-SCNC: 101 MMOL/L (ref 97–110)
CO2 SERPL-SCNC: 27 MMOL/L (ref 21–31)
COLOR UR: YELLOW
CREAT SERPL-MCNC: 0.8 MG/DL (ref 0.61–1.24)
EOSINOPHIL # BLD: 0 10^3/UL (ref 0–0.5)
EOSINOPHIL NFR BLD: 0.1 % (ref 0–7)
ERYTHROCYTE [DISTWIDTH] IN BLOOD BY AUTOMATED COUNT: 12.1 % (ref 11.5–14.5)
GLUCOSE SERPL-MCNC: 105 MG/DL (ref 70–220)
GLUCOSE UR STRIP-MCNC: NEGATIVE MG/DL
HCT VFR BLD CALC: 37.7 % (ref 42–52)
HGB BLD-MCNC: 12.3 G/DL (ref 14–18)
KETONES UR STRIP.AUTO-MCNC: (no result) MG/DL
LYMPHOCYTES # BLD AUTO: 2.5 10^3/UL (ref 0.8–2.9)
LYMPHOCYTES NFR BLD AUTO: 18 % (ref 15–51)
MCH RBC QN AUTO: 29.1 PG (ref 29–33)
MCHC RBC AUTO-ENTMCNC: 32.6 G/DL (ref 32–37)
MCV RBC AUTO: 89.3 FL (ref 82–101)
MONOCYTES # BLD: 1.3 10^3/UL (ref 0.3–0.9)
MONOCYTES NFR BLD: 9.3 % (ref 0–11)
NEUTROPHILS # BLD: 9.9 10^3/UL (ref 1.6–7.5)
NEUTROPHILS NFR BLD AUTO: 72.1 % (ref 39–77)
NITRITE UR QL STRIP.AUTO: NEGATIVE MG/DL
NRBC # BLD MANUAL: 0 10^3/UL (ref 0–0)
NRBC BLD AUTO-RTO: 0 /100WBC (ref 0–0)
PLATELET # BLD: 384 10^3/UL (ref 140–415)
PMV BLD AUTO: 10.1 FL (ref 7.4–10.4)
POTASSIUM SERPL-SCNC: 3.6 MMOL/L (ref 3.5–5.1)
RBC # BLD AUTO: 4.22 10^6/UL (ref 4.7–6.1)
RBC # UR AUTO: (no result) MG/DL
SODIUM SERPL-SCNC: 138 MMOL/L (ref 135–144)
UR ASCORBIC ACID: NEGATIVE MG/DL
UR BILIRUBIN (DIP): NEGATIVE MG/DL
UR CLARITY: CLEAR
UR PH (DIP): 6 (ref 5–9)
UR RBC: 2 /HPF (ref 0–5)
UR SPECIFIC GRAVITY (DIP): 1.01 (ref 1–1.03)
UR TOTAL PROTEIN (DIP): NEGATIVE MG/DL
UROBILINOGEN UR STRIP-ACNC: NEGATIVE MG/DL
WBC # BLD AUTO: 13.7 10^3/UL (ref 4.8–10.8)
WBC # UR STRIP: NEGATIVE LEU/UL

## 2017-12-13 RX ADMIN — RIVAROXABAN SCH MG: 10 TABLET, FILM COATED ORAL at 17:18

## 2017-12-13 RX ADMIN — HYDROMORPHONE HYDROCHLORIDE PRN MG: 1 INJECTION, SOLUTION INTRAMUSCULAR; INTRAVENOUS; SUBCUTANEOUS at 06:55

## 2017-12-13 RX ADMIN — DOCUSATE SODIUM AND SENNOSIDES SCH TAB: 8.6; 5 TABLET, FILM COATED ORAL at 20:37

## 2017-12-13 RX ADMIN — HYDROMORPHONE HYDROCHLORIDE PRN MG: 1 INJECTION, SOLUTION INTRAMUSCULAR; INTRAVENOUS; SUBCUTANEOUS at 03:57

## 2017-12-13 RX ADMIN — DOCUSATE SODIUM AND SENNOSIDES SCH TAB: 8.6; 5 TABLET, FILM COATED ORAL at 09:05

## 2017-12-13 NOTE — PN
Date/Time of Note


Date/Time of Note


DATE: 12/13/17 


TIME: 12:59





Assessment/Plan


VTE Prophylaxis


VTE Prophylaxis Intervention:  other (xarelto)





Lines/Catheters


IV Catheter Type (from Nrsg):  Saline Lock


Urinary Cath still in place:  No





Assessment/Plan


Assessment/Plan


1.  Left distal tibial fracture


(Comminuted intra-articular displaced left distal tibial fracture with 

posterior displacement of the majority of the articular surface.)


- s/p emergent ORIF


-on PCA morphine pump for pain with oral percocets and IV cefazolin per ortho


-XRs reviewed without concern for re-intervention





2.  Mild hypernatremia: resolved





3.  Obesity, with a BMI of 36


- weight reduction advise reinforced





4. Leukocytosis and Post op fever: uA and CXR negative so far, f/u blood 

cultures, continue abx per ortho / continue IS, titrate abx if blood cultures 

come back positive





Plan for d/c when fever free x24hr





Subjective


24 Hr Interval Summary


Free Text/Dictation


patient developed fever overnight, feels better now though. Pain in leg is gone 

now.





Exam/Review of Systems


Vital Signs


Vitals





 Vital Signs








  Date Time  Temp Pulse Resp B/P Pulse Ox O2 Delivery O2 Flow Rate FiO2


 


12/13/17 07:38 98.7 96 18 133/71 95   


 


12/11/17 16:00      Room Air  


 


12/11/17 15:19       6.0 














 Intake and Output   


 


 12/12/17 12/12/17 12/13/17





 14:59 22:59 06:59


 


Intake Total 1900 ml 2170 ml 670 ml


 


Output Total 1200 ml  720 ml


 


Balance 700 ml 2170 ml -50 ml











Exam


Constitutional:  alert


Psych:  calm


Eyes:  PERRL


ENMT:  mucosa pink and moist


Neck:  supple


Respiratory:  clear to auscultation


Cardiovascular:  regular rate and rhythm, 


   No murmurs/extra sounds


Gastrointestinal:  bowel sounds, non-tender, soft


Musculoskeletal:  other (R knee in cast fro toes to just below knee. Area 

around R knee is non tender nor inflammed nor obviously displaced)


Neurological:  nl mental status


Skin:  No rash or lesions





Results


Result Diagram:  


12/13/17 0350                                                                  

              12/13/17 0350





Results 24 hrs





Laboratory Tests








Test


  12/13/17


03:11 12/13/17


03:50


 


Urine Color YELLOW   


 


Urine Clarity CLEAR   


 


Urine pH 6.0   


 


Urine Specific Gravity 1.014   


 


Urine Ketones TRACE  A 


 


Urine Nitrite NEGATIVE   


 


Urine Bilirubin NEGATIVE   


 


Urine Urobilinogen NEGATIVE   


 


Urine Leukocyte Esterase NEGATIVE   


 


Urine Microscopic RBC 2   


 


Urine Microscopic WBC 1   


 


Urine Hemoglobin 1+  H 


 


Urine Glucose NEGATIVE   


 


Urine Total Protein NEGATIVE   


 


White Blood Count  13.7  H


 


Red Blood Count  4.22  L


 


Hemoglobin  12.3  L


 


Hematocrit  37.7  L


 


Mean Corpuscular Volume  89.3  


 


Mean Corpuscular Hemoglobin  29.1  


 


Mean Corpuscular Hemoglobin


Concent 


  32.6  


 


 


Red Cell Distribution Width  12.1  


 


Platelet Count  384  


 


Mean Platelet Volume  10.1  


 


Neutrophils %  72.1  


 


Lymphocytes %  18.0  


 


Monocytes %  9.3  


 


Eosinophils %  0.1  


 


Basophils %  0.1  


 


Nucleated Red Blood Cells %  0.0  


 


Neutrophils #  9.9  H


 


Lymphocytes #  2.5  


 


Monocytes #  1.3  H


 


Eosinophils #  0.0  


 


Basophils #  0.0  


 


Nucleated Red Blood Cells #  0.0  


 


Sodium Level  138  


 


Potassium Level  3.6  


 


Chloride Level  101  


 


Carbon Dioxide Level  27  


 


Anion Gap  14  


 


Blood Urea Nitrogen  6  L


 


Creatinine  0.80  


 


Glucose Level  105  


 


Calcium Level  8.6  











Medications


Medications





 Current Medications


Ondansetron HCl (Zofran Inj) 4 mg Q6H  PRN IV NAUSEA AND/OR VOMITING Last 

administered on 12/11/17at 16:41; Admin Dose 4 MG;  Start 12/10/17 at 22:00


Senna/Docusate Sodium (Senokot-S) 1 tab BID PO  Last administered on 12/13/17at 

09:05; Admin Dose 1 TAB;  Start 12/11/17 at 21:00


Magnesium Hydroxide (Milk Of Mag) 30 ml BID  PRN PO CONSTIPATION;  Start 12/11/ 17 at 16:00


Ondansetron HCl (Zofran Inj) 4 mg Q4H  PRN IV NAUSEA AND/OR VOMITING Last 

administered on 12/11/17at 20:35; Admin Dose 4 MG;  Start 12/11/17 at 16:00


Diphenhydramine HCl (Benadryl) 25 mg Q4H  PRN PO ITCHING;  Start 12/11/17 at 16:

00


Oxycodone HCl (Roxicodone) 10 mg Q4H PO  Last administered on 12/13/17at 12:43; 

Admin Dose 10 MG;  Start 12/12/17 at 13:00


Acetaminophen (Tylenol Tab) 500 mg Q6H  PRN PO PAIN AND OR ELEVATED TEMP Last 

administered on 12/13/17at 03:57; Admin Dose 500 MG;  Start 12/12/17 at 13:00


Hydromorphone HCl (Dilaudid) 1 mg Q3H  PRN IV PAIN LEVEL 8-10 Last administered 

on 12/13/17at 06:55; Admin Dose 1 MG;  Start 12/12/17 at 13:00











WILLIE BAI Dec 13, 2017 13:02

## 2017-12-13 NOTE — HP
Date/Time of Note


Date/Time of Note


DATE: 12/13/17 


TIME: 10:33





Assessment/Plan


Lines/Catheters


IV Catheter Type (from Nrsg):  Saline Lock


Urinary Cath still in place:  No





Assessment/Plan


Assessment/Plan


1.  Left distal tibial fracture


(Comminuted intra-articular displaced left distal tibial fracture with 

posterior displacement of the majority of the articular surface.)


- s/p emergent ORIF


-on PCA morphine pump for pain with oral percocets and IV cefazolin per ortho


-Will order XRs to ensure continued satisfactory alignment / also notify ortho





2.  Mild hypernatremia: resolved





3.  Obesity, with a BMI of 36


- weight reduction advise reinforced





4. leucocytosis : likely stress related





5. Post op fever





HPI/ROS


Admit Date/Time


Admit Date/Time


Dec 10, 2017 at 18:16





Hx of Present Illness


patient developed fever overnight





ROS


Psychological:  anxiety





PMH/Family/Social


Past Medical History


Medical History:  no pertinent history





Past Surgical History


Past Surgical Hx:  no surgical history





Social History


Alcohol Use:  none


Smoking Status:  Never smoker


Drug Use:  none





Exam/Review of Systems


Vital Signs


Vitals





 Vital Signs








  Date Time  Temp Pulse Resp B/P Pulse Ox O2 Delivery O2 Flow Rate FiO2


 


12/13/17 07:38 98.7 96 18 133/71 95   


 


12/11/17 16:00      Room Air  


 


12/11/17 15:19       6.0 














 Intake and Output   


 


 12/12/17 12/12/17 12/13/17





 14:59 22:59 06:59


 


Intake Total 1900 ml 2170 ml 670 ml


 


Output Total 1200 ml  720 ml


 


Balance 700 ml 2170 ml -50 ml











Labs


Result Diagram:  


12/13/17 0350                                                                  

              12/13/17 0350








Medications


Medications





 Current Medications


Ondansetron HCl (Zofran Inj) 4 mg Q6H  PRN IV NAUSEA AND/OR VOMITING Last 

administered on 12/11/17at 16:41; Admin Dose 4 MG;  Start 12/10/17 at 22:00


Senna/Docusate Sodium (Senokot-S) 1 tab BID PO  Last administered on 12/13/17at 

09:05; Admin Dose 1 TAB;  Start 12/11/17 at 21:00


Magnesium Hydroxide (Milk Of Mag) 30 ml BID  PRN PO CONSTIPATION;  Start 12/11/ 17 at 16:00


Ondansetron HCl (Zofran Inj) 4 mg Q4H  PRN IV NAUSEA AND/OR VOMITING Last 

administered on 12/11/17at 20:35; Admin Dose 4 MG;  Start 12/11/17 at 16:00


Diphenhydramine HCl (Benadryl) 25 mg Q4H  PRN PO ITCHING;  Start 12/11/17 at 16:

00


Oxycodone HCl (Roxicodone) 10 mg Q4H PO  Last administered on 12/13/17at 09:05; 

Admin Dose 10 MG;  Start 12/12/17 at 13:00


Acetaminophen (Tylenol Tab) 500 mg Q6H  PRN PO PAIN AND OR ELEVATED TEMP Last 

administered on 12/13/17at 03:57; Admin Dose 500 MG;  Start 12/12/17 at 13:00


Hydromorphone HCl (Dilaudid) 1 mg Q3H  PRN IV PAIN LEVEL 8-10 Last administered 

on 12/13/17at 06:55; Admin Dose 1 MG;  Start 12/12/17 at 13:00











WILLIE BAI Dec 13, 2017 10:33

## 2017-12-14 VITALS — SYSTOLIC BLOOD PRESSURE: 123 MMHG | RESPIRATION RATE: 20 BRPM | DIASTOLIC BLOOD PRESSURE: 77 MMHG

## 2017-12-14 VITALS — RESPIRATION RATE: 18 BRPM | SYSTOLIC BLOOD PRESSURE: 135 MMHG | DIASTOLIC BLOOD PRESSURE: 71 MMHG

## 2017-12-14 VITALS — SYSTOLIC BLOOD PRESSURE: 132 MMHG | RESPIRATION RATE: 20 BRPM | DIASTOLIC BLOOD PRESSURE: 72 MMHG

## 2017-12-14 VITALS — SYSTOLIC BLOOD PRESSURE: 133 MMHG | DIASTOLIC BLOOD PRESSURE: 79 MMHG | RESPIRATION RATE: 20 BRPM

## 2017-12-14 LAB
ANION GAP SERPL CALC-SCNC: 16 MMOL/L (ref 8–16)
BASOPHILS # BLD AUTO: 0 10^3/UL (ref 0–0.1)
BASOPHILS NFR BLD: 0.2 % (ref 0–2)
BUN SERPL-MCNC: 8 MG/DL (ref 7–20)
CALCIUM SERPL-MCNC: 9.1 MG/DL (ref 8.4–10.2)
CHLORIDE SERPL-SCNC: 98 MMOL/L (ref 97–110)
CO2 SERPL-SCNC: 30 MMOL/L (ref 21–31)
CREAT SERPL-MCNC: 0.88 MG/DL (ref 0.61–1.24)
EOSINOPHIL # BLD: 0.1 10^3/UL (ref 0–0.5)
EOSINOPHIL NFR BLD: 0.6 % (ref 0–7)
ERYTHROCYTE [DISTWIDTH] IN BLOOD BY AUTOMATED COUNT: 12.3 % (ref 11.5–14.5)
GLUCOSE SERPL-MCNC: 99 MG/DL (ref 70–220)
HCT VFR BLD CALC: 36.3 % (ref 42–52)
HGB BLD-MCNC: 12.3 G/DL (ref 14–18)
LYMPHOCYTES # BLD AUTO: 3 10^3/UL (ref 0.8–2.9)
LYMPHOCYTES NFR BLD AUTO: 23.3 % (ref 15–51)
MCH RBC QN AUTO: 30.1 PG (ref 29–33)
MCHC RBC AUTO-ENTMCNC: 33.9 G/DL (ref 32–37)
MCV RBC AUTO: 89 FL (ref 82–101)
MONOCYTES # BLD: 1.3 10^3/UL (ref 0.3–0.9)
MONOCYTES NFR BLD: 9.9 % (ref 0–11)
NEUTROPHILS # BLD: 8.3 10^3/UL (ref 1.6–7.5)
NEUTROPHILS NFR BLD AUTO: 65.4 % (ref 39–77)
NRBC # BLD MANUAL: 0 10^3/UL (ref 0–0)
NRBC BLD AUTO-RTO: 0 /100WBC (ref 0–0)
PLATELET # BLD: 371 10^3/UL (ref 140–415)
PMV BLD AUTO: 9.9 FL (ref 7.4–10.4)
POTASSIUM SERPL-SCNC: 3.6 MMOL/L (ref 3.5–5.1)
RBC # BLD AUTO: 4.08 10^6/UL (ref 4.7–6.1)
SODIUM SERPL-SCNC: 140 MMOL/L (ref 135–144)
WBC # BLD AUTO: 12.6 10^3/UL (ref 4.8–10.8)

## 2017-12-14 RX ADMIN — DOCUSATE SODIUM AND SENNOSIDES SCH TAB: 8.6; 5 TABLET, FILM COATED ORAL at 20:55

## 2017-12-14 RX ADMIN — RIVAROXABAN SCH MG: 10 TABLET, FILM COATED ORAL at 17:11

## 2017-12-14 RX ADMIN — DOCUSATE SODIUM AND SENNOSIDES SCH TAB: 8.6; 5 TABLET, FILM COATED ORAL at 08:57

## 2017-12-14 NOTE — PN
Date/Time of Note


Date/Time of Note


DATE: 12/14/17 


TIME: 09:11





Assessment/Plan


VTE Prophylaxis


VTE Prophylaxis Intervention:  other (xarelto)





Lines/Catheters


IV Catheter Type (from Nrsg):  Saline Lock


Urinary Cath still in place:  No





Assessment/Plan


Assessment/Plan


1.  Left distal tibial fracture


(Comminuted intra-articular displaced left distal tibial fracture with 

posterior displacement of the majority of the articular surface.)


- s/p emergent ORIF


-on PCA morphine pump for pain with oral percocets and IV cefazolin per ortho


-XRs reviewed without concern for re-intervention





2.  Mild hypernatremia: resolved





3.  Obesity, with a BMI of 36


- weight reduction advise reinforced





4. Leukocytosis and Post op fever: uA and CXR negative so far, f/u blood 

cultures, continue abx per ortho / continue IS, titrate abx if blood cultures 

come back positive





Plan for d/c when fever free x24hr


The majority of healthy adults with vitamin D deficiency (eg, serum 25-

hydroxyvitamin D [25(OH)D] 10 to 20 ng/mL [25 to 50 nmol/L]) do not require any 

additional evaluation





Subjective


24 Hr Interval Summary


Free Text/Dictation


still having low grade fevers





Exam/Review of Systems


Vital Signs


Vitals





 Vital Signs








  Date Time  Temp Pulse Resp B/P Pulse Ox O2 Delivery O2 Flow Rate FiO2


 


12/14/17 07:48 99.3 100 18 135/71 95   


 


12/11/17 16:00      Room Air  


 


12/11/17 15:19       6.0 














 Intake and Output   


 


 12/13/17 12/13/17 12/14/17





 15:00 23:00 07:00


 


Intake Total 50 ml 1280 ml 700 ml


 


Output Total  1600 ml 


 


Balance 50 ml -320 ml 700 ml











Exam


Constitutional:  alert


Psych:  calm


Eyes:  PERRL


ENMT:  mucosa pink and moist


Neck:  supple


Respiratory:  clear to auscultation


Cardiovascular:  regular rate and rhythm, 


   No murmurs/extra sounds


Gastrointestinal:  bowel sounds, non-tender, soft


Musculoskeletal:  other (R knee in cast from toes to just below knee. Area 

around R knee remains non tender nor inflammed nor obviously displaced)


Neurological:  nl mental status


Skin:  No rash or lesions





Results


Result Diagram:  


12/14/17 0521 12/14/17 0521





Results 24 hrs





Laboratory Tests








Test


  12/14/17


05:21


 


White Blood Count 12.6  H


 


Red Blood Count 4.08  L


 


Hemoglobin 12.3  L


 


Hematocrit 36.3  L


 


Mean Corpuscular Volume 89.0  


 


Mean Corpuscular Hemoglobin 30.1  


 


Mean Corpuscular Hemoglobin


Concent 33.9  


 


 


Red Cell Distribution Width 12.3  


 


Platelet Count 371  


 


Mean Platelet Volume 9.9  


 


Neutrophils % 65.4  


 


Lymphocytes % 23.3  


 


Monocytes % 9.9  


 


Eosinophils % 0.6  


 


Basophils % 0.2  


 


Nucleated Red Blood Cells % 0.0  


 


Neutrophils # 8.3  H


 


Lymphocytes # 3.0  H


 


Monocytes # 1.3  H


 


Eosinophils # 0.1  


 


Basophils # 0.0  


 


Nucleated Red Blood Cells # 0.0  


 


Sodium Level 140  


 


Potassium Level 3.6  


 


Chloride Level 98  


 


Carbon Dioxide Level 30  


 


Anion Gap 16  


 


Blood Urea Nitrogen 8  


 


Creatinine 0.88  


 


Glucose Level 99  


 


Calcium Level 9.1  











Medications


Medications





 Current Medications


Ondansetron HCl (Zofran Inj) 4 mg Q6H  PRN IV NAUSEA AND/OR VOMITING Last 

administered on 12/11/17at 16:41; Admin Dose 4 MG;  Start 12/10/17 at 22:00


Senna/Docusate Sodium (Senokot-S) 1 tab BID PO  Last administered on 12/14/17at 

08:57; Admin Dose 1 TAB;  Start 12/11/17 at 21:00


Magnesium Hydroxide (Milk Of Mag) 30 ml BID  PRN PO CONSTIPATION Last 

administered on 12/14/17at 08:57; Admin Dose 30 ML;  Start 12/11/17 at 16:00


Ondansetron HCl (Zofran Inj) 4 mg Q4H  PRN IV NAUSEA AND/OR VOMITING Last 

administered on 12/11/17at 20:35; Admin Dose 4 MG;  Start 12/11/17 at 16:00


Diphenhydramine HCl (Benadryl) 25 mg Q4H  PRN PO ITCHING;  Start 12/11/17 at 16:

00


Oxycodone HCl (Roxicodone) 10 mg Q4H PO  Last administered on 12/14/17at 08:58; 

Admin Dose 10 MG;  Start 12/12/17 at 13:00


Acetaminophen (Tylenol Tab) 500 mg Q6H  PRN PO PAIN AND OR ELEVATED TEMP Last 

administered on 12/13/17at 03:57; Admin Dose 500 MG;  Start 12/12/17 at 13:00


Hydromorphone HCl (Dilaudid) 1 mg Q3H  PRN IV PAIN LEVEL 8-10 Last administered 

on 12/13/17at 06:55; Admin Dose 1 MG;  Start 12/12/17 at 13:00











WILLIE BAI Dec 14, 2017 09:12

## 2017-12-14 NOTE — DS
Date/Time of Note


Date/Time of Note


DATE: 12/14/17 


TIME: 12:29





Discharge Summary


Admission/Discharge Info


Admit Date/Time


Dec 10, 2017 at 18:16


Discharge Date/Time





12/14/17


.


Discharge Diagnosis


1.  Left distal tibial fracture


(Comminuted intra-articular displaced left distal tibial fracture with 

posterior displacement of the majority of the articular surface.)


- s/p emergent ORIF 12/11/17





2.  Mild hypernatremia: resolved





3.  Obesity, with a BMI of 36


- weight reduction advised and  reinforced





4. Leukocytosis and Post op fever: resolved 





5. Mild Vitamin D deficiency with serum 25-hydroxyvitamin D of 11ng/ml : The 

majority of healthy adults with vitamin D deficiency (eg, serum 25-

hydroxyvitamin D [25(OH)D] 10 to 20 ng/mL [25 to 50 nmol/L]) do not require any 

additional evaluation





.


Patient Condition:  Stable


Consults





Orthopedic Surgery: Brian Bergman MD


.


Procedures








See hospital course


.


Hx of Present Illness





See hospital course


.


Hospital Course





22-year-old male who was sent to the emergency room for admission in 

preparation for an urgent open reduction and internal fixation with 3 week old 

ankle fracture he had sustained after fall.  He underwent ORIF, for details of 

the surgery please review the surgeon's notes.  His postoperative course was 

conjugated by severe pain at the surgical site as well as postoperative fever.  

There was no obvious source of the fever, or fever improved with just incentive 

spirometer and ambulation.  Of note is that patient was continued on IV 

antibiotics from the time of surgery throughout his hospitalization.  At this 

time he has done quite well, his mother and 24 hours without a significant 

fever of 100.4. or more.  Patient is clinically stable and is desirous of 

discharge.  I am comfortable discharging to complete a 10 day antibiotic course

, and continued outpatient follow-up with orthopedic surgery which has already 

been scheduled for the next 4 days.  Patient understands the importance of 

returning to the emergency room if he develops fever, redness or swelling, or 

severe pain in his ankle or anywhere else.  He has verbalized understanding and 

agreement with the plan.


.


Home Meds


Reported Medications


Hydrocodone Bit-Acetaminophen (Hydrocodone Bit-APAP) 5-325MG Tablet, 1 TAB PO 

Q6H Y for PAIN, TAB


   12/10/17


Ibuprofen* (Ibuprofen*) 600 Mg Tablet, 600 MG PO Q6 Y for PAIN, TAB


   12/10/17


Follow-up Plan


Call Dr Bergman's office to confirm your followup appointment





Name, Degree : Brian Bergman MD 


Specialty : Orthopedic Surgery  


Office Address : [facility] [address] 

[place] [address] 


Office Telephone : [phone] Ext. [phone] 


Office Fax : [phone]





   Also followup with your primary doctor within the next 1-2 weeks.


   If you don't have one please let someone know, we can give you resources 

that may help you pick one.


   You may call Dr Juan Harper's office. he's accepting new patients


   


   Name, Degree: Juan Harper MD Specialty: Internal Medicine Comments: 

Office Address: [facility]


   [address]


   [place] [address] Office Telephone: [phone] Office Fax: [phone]


   


   You may also call your insurance company to assign one to you. 


   Review your medication list with your nurse before leaving and if you need 

new prescriptions please let your nurse know.


   I may have made changes to your home medications or given you new 

prescriptions, please let your primary doctor know as well. 


   Stay compliant with your medications and report any side effects to your PCP 

or pharmacist. 


   Return to the ER if you have any concerns and cannot reach your doctors or 

call your insurance company, they usually have a nurse that can help you.


Primary Care Provider


Gabe Flores MD


Time spent on discharge:   > 30 minutes


Pending Labs





Laboratory Tests








Test


  12/14/17


05:21


 


White Blood Count


  12.610^3/ul


(4.8-10.8)


 


Red Blood Count


  4.0810^6/ul


(4.70-6.10)


 


Hemoglobin


  12.3g/dl


(14.0-18.0)


 


Hematocrit


  36.3%


(42.0-52.0)


 


Mean Corpuscular Volume


  89.0fl


(82.0-101.0)


 


Mean Corpuscular Hemoglobin


  30.1pg


(29.0-33.0)


 


Mean Corpuscular Hemoglobin


Concent 33.9g/dl


(32.0-37.0)


 


Red Cell Distribution Width


  12.3%


(11.5-14.5)


 


Platelet Count


  06386^3/UL


(140-415)


 


Mean Platelet Volume


  9.9fl


(7.4-10.4)


 


Neutrophils %


  65.4%


(39.0-77.0)


 


Lymphocytes %


  23.3%


(15.0-51.0)


 


Monocytes %


  9.9%


(0.0-11.0)


 


Eosinophils % 0.6% (0.0-7.0) 


 


Basophils % 0.2% (0.0-2.0) 


 


Nucleated Red Blood Cells %


  0.0/100WBC


(0.0-0.0)


 


Neutrophils #


  8.310^3/ul


(1.6-7.5)


 


Lymphocytes #


  3.010^3/ul


(0.8-2.9)


 


Monocytes #


  1.310^3/ul


(0.3-0.9)


 


Eosinophils #


  0.110^3/ul


(0.0-0.5)


 


Basophils #


  0.010^3/ul


(0.0-0.1)


 


Nucleated Red Blood Cells #


  0.010^3/ul


(0.0-0.0)


 


Sodium Level


  140mmol/L


(135-144)


 


Potassium Level


  3.6mmol/L


(3.5-5.1)


 


Chloride Level


  98mmol/L


()


 


Carbon Dioxide Level


  30mmol/L


(21-31)


 


Anion Gap 16 (8-16) 


 


Blood Urea Nitrogen 8mg/dl (7-20) 


 


Creatinine


  0.88mg/dl


(0.61-1.24)


 


Glucose Level


  99mg/dl


()


 


Calcium Level


  9.1mg/dl


(8.4-10.2)

















WILLIE BAI Dec 14, 2017 12:35

## 2017-12-14 NOTE — RADRPT
PROCEDURE:  Fluoroscopic spot views of the left ankle for surgical guidance

      

CLINICAL INDICATION: Trauma with left ankle pain fracture

 

TECHNIQUE:   18 fluoroscopic spot views of the left ankle. Images were submitted to the radiology de
partment for interpretation.

 

COMPARISON:   12/10/2017 

 

FINDINGS:

 

Comminuted fracture of the distal tibia is seen. Interval open reduction and internal fixation is se
en as multiple surgical instruments are identified. The images were reviewed by the attending physic
maxime at the time of the procedure.

 

Fluoroscopy time was 88.6 seconds.

 

IMPRESSION:

Intraoperative images were obtained of the  for surgical guidance. Please refer to the surgeon's ope
rative notes for further details.

 

RPTAT: HPNM

_____________________________________________ 

Physician Patricia           Date    Time 

Electronically viewed and signed by Physician Patricia on 12/14/2017 15:21 

 

D:  12/14/2017 15:21  T:  12/14/2017 15:21

PM/

## 2017-12-14 NOTE — PN
Date/Time of Note


Date/Time of Note


DATE: 12/14/17 


TIME: 13:25





Assessment/Plan


Lines/Catheters


IV Catheter Type (from Nrsg):  Saline Lock


Elder in Place (from Nrsg):  No





Assessment/Plan


Chief Complaint/Hosp Course


Patient is a 22 year old male who sustained a distal tibial pilon fracture 3.5 

weeks ago. Due to insurance issues patient does not present for surgery until 

now. He was admitted through the ER yesterday and ct scan shows displaced  

ankle fracture dislocation. Patient requires urgent surgical fixation. Pain is 

moderately controlled


Problems:  


Assessment/Plan


POD #3 s/p


Left ankle fracture dislocation displaced distal tibial pilon fracture open 

reduction internal fixation with allograft


Application of external fixator followed by removal of ex- fix


Low Vit D of 11 - will need to start 50,000 IU daily


Elevated temp likely related to atelectasis - encourage IS


- NWB to the LLE


- PT to  GT


- PO Pain meds


- Ok to DC when ok with Primary





E Pacheco





Subjective


24 Hr Interval Summary


Patient is doing much better





Exam/Review of Systems


Vital Signs


Vitals





 Vital Signs








  Date Time  Temp Pulse Resp B/P Pulse Ox O2 Delivery O2 Flow Rate FiO2


 


12/15/17 14:00 99.6 103 17 144/83 97   











Exam


Constitutional:  alert, oriented, well developed


Musculoskeletal:  other (LLE/ Splint intact - toes wiggle, SILT to the exposed 

toes, CR brisk)





Results


Result Diagram:  


12/15/17 0537                                                                  

              12/15/17 0537














SELENA ZHONG MD Dec 14, 2017 13:26


SELENA ZHONG MD Dec 14, 2017 13:26

## 2017-12-15 VITALS — SYSTOLIC BLOOD PRESSURE: 144 MMHG | DIASTOLIC BLOOD PRESSURE: 83 MMHG | RESPIRATION RATE: 17 BRPM

## 2017-12-15 VITALS — DIASTOLIC BLOOD PRESSURE: 78 MMHG | RESPIRATION RATE: 20 BRPM | SYSTOLIC BLOOD PRESSURE: 128 MMHG

## 2017-12-15 VITALS — DIASTOLIC BLOOD PRESSURE: 84 MMHG | SYSTOLIC BLOOD PRESSURE: 136 MMHG | RESPIRATION RATE: 18 BRPM

## 2017-12-15 LAB
ANION GAP SERPL CALC-SCNC: 13 MMOL/L (ref 8–16)
BASOPHILS # BLD AUTO: 0 10^3/UL (ref 0–0.1)
BASOPHILS NFR BLD: 0.2 % (ref 0–2)
BUN SERPL-MCNC: 9 MG/DL (ref 7–20)
CALCIUM SERPL-MCNC: 9 MG/DL (ref 8.4–10.2)
CHLORIDE SERPL-SCNC: 97 MMOL/L (ref 97–110)
CO2 SERPL-SCNC: 30 MMOL/L (ref 21–31)
CREAT SERPL-MCNC: 0.84 MG/DL (ref 0.61–1.24)
EOSINOPHIL # BLD: 0.2 10^3/UL (ref 0–0.5)
EOSINOPHIL NFR BLD: 1.6 % (ref 0–7)
ERYTHROCYTE [DISTWIDTH] IN BLOOD BY AUTOMATED COUNT: 12.1 % (ref 11.5–14.5)
GLUCOSE SERPL-MCNC: 106 MG/DL (ref 70–220)
HCT VFR BLD CALC: 36.6 % (ref 42–52)
HGB BLD-MCNC: 12.1 G/DL (ref 14–18)
LYMPHOCYTES # BLD AUTO: 2.2 10^3/UL (ref 0.8–2.9)
LYMPHOCYTES NFR BLD AUTO: 23.3 % (ref 15–51)
MAGNESIUM SERPL-MCNC: 2.1 MG/DL (ref 1.7–2.5)
MCH RBC QN AUTO: 29.6 PG (ref 29–33)
MCHC RBC AUTO-ENTMCNC: 33.1 G/DL (ref 32–37)
MCV RBC AUTO: 89.5 FL (ref 82–101)
MONOCYTES # BLD: 0.9 10^3/UL (ref 0.3–0.9)
MONOCYTES NFR BLD: 9.6 % (ref 0–11)
NEUTROPHILS # BLD: 6.2 10^3/UL (ref 1.6–7.5)
NEUTROPHILS NFR BLD AUTO: 64.9 % (ref 39–77)
NRBC # BLD MANUAL: 0 10^3/UL (ref 0–0)
NRBC BLD AUTO-RTO: 0 /100WBC (ref 0–0)
PLATELET # BLD: 432 10^3/UL (ref 140–415)
PMV BLD AUTO: 10.1 FL (ref 7.4–10.4)
POTASSIUM SERPL-SCNC: 3.8 MMOL/L (ref 3.5–5.1)
RBC # BLD AUTO: 4.09 10^6/UL (ref 4.7–6.1)
SODIUM SERPL-SCNC: 136 MMOL/L (ref 135–144)
WBC # BLD AUTO: 9.6 10^3/UL (ref 4.8–10.8)

## 2017-12-15 RX ADMIN — DOCUSATE SODIUM AND SENNOSIDES SCH TAB: 8.6; 5 TABLET, FILM COATED ORAL at 08:53

## 2017-12-15 RX ADMIN — RIVAROXABAN SCH MG: 10 TABLET, FILM COATED ORAL at 17:27

## 2017-12-15 NOTE — PDOCDIS
Discharge Instructions


DIAGNOSIS


Discharge Diagnosis


1.  Left distal tibial fracture


(Comminuted intra-articular displaced left distal tibial fracture with 

posterior displacement of the majority of the articular surface.)


- s/p emergent ORIF 12/11/17





2.  Mild hypernatremia: resolved





3.  Obesity, with a BMI of 36


- weight reduction advised and  reinforced





4. Leukocytosis and Post op fever: resolved 





5. Mild Vitamin D deficiency with serum 25-hydroxyvitamin D of 11ng/ml : The 

majority of healthy adults with vitamin D deficiency (eg, serum 25-

hydroxyvitamin D [25(OH)D] 10 to 20 ng/mL [25 to 50 nmol/L]) do not require any 

additional evaluation





.





CONDITION


Patient Condition:  Stable





HOME CARE INSTRUCTIONS:


Special Diet:  regular diet





ACTIVITY:








Activity Restrictions:   Slowly Increase Activity





 Rest between Activity











FOLLOW UP/APPOINTMENTS


Follow-up Plan


Call Dr Bergman's office to confirm your followup appointment





Name, Degree : Brian Bergman MD 


Specialty : Orthopedic Surgery  


Office Address : [address] 

[place] [address] 


Office Telephone : [phone] Ext. [phone] 


Office Fax : [phone]





   Also followup with your primary doctor within the next 1-2 weeks.


   If you don't have one please let someone know, we can give you resources 

that may help you pick one.


   You may call Dr Juan Harper's office. he's accepting new patients


   


   Name, Degree: Juan Harper MD Specialty: Internal Medicine Comments: 

Office Address: [address] [place] [address]


   [address]


   [place] [address] Office Telephone: [phone] Office Fax: [phone]


   


   You may also call your insurance company to assign one to you. 


   Review your medication list with your nurse before leaving and if you need 

new prescriptions please let your nurse know.


   I may have made changes to your home medications or given you new 

prescriptions, please let your primary doctor know as well. 


   Stay compliant with your medications and report any side effects to your PCP 

or pharmacist. 


   Return to the ER if you have any concerns and cannot reach your doctors or 

call your insurance company, they usually have a nurse that can help you.











WILLIE BAI Dec 15, 2017 10:37

## 2017-12-15 NOTE — DS
Date/Time of Note


Date/Time of Note


DATE: 12/15/17 


TIME: 15:07





Discharge Summary


Admission/Discharge Info


Admit Date/Time


Dec 10, 2017 at 18:16


Discharge Date/Time





Discharge Diagnosis


1.  Left distal tibial fracture


(Comminuted intra-articular displaced left distal tibial fracture with 

posterior displacement of the majority of the articular surface.)


- s/p emergent ORIF 12/11/17





2.  Mild hypernatremia: resolved





3.  Obesity, with a BMI of 36


- weight reduction advised and  reinforced





4. Leukocytosis and Post op fever: resolved 





5. Mild Vitamin D deficiency with serum 25-hydroxyvitamin D of 11ng/ml : The 

majority of healthy adults with vitamin D deficiency (eg, serum 25-

hydroxyvitamin D [25(OH)D] 10 to 20 ng/mL [25 to 50 nmol/L]) do not require any 

additional evaluation





.


Patient Condition:  Stable


Hx of Present Illness





See hospital course


.


Hospital Course





22-year-old male who was sent to the emergency room for admission in 

preparation for an urgent open reduction and internal fixation with 3 week old 

ankle fracture he had sustained after fall.  He underwent ORIF, for details of 

the surgery please review the surgeon's notes.  His postoperative course was 

conjugated by severe pain at the surgical site as well as postoperative fever.  

There was no obvious source of the fever, or fever improved with just incentive 

spirometer and ambulation.  Of note is that patient was continued on IV 

antibiotics from the time of surgery throughout his hospitalization.  At this 

time he has done quite well, his mother and 24 hours without a significant 

fever of 100.4. or more.  Patient is clinically stable and is desirous of 

discharge.  I am comfortable discharging to complete a 10 day antibiotic course

, and continued outpatient follow-up with orthopedic surgery which has already 

been scheduled for the next 4 days.  Patient understands the importance of 

returning to the emergency room if he develops fever, redness or swelling, or 

severe pain in his ankle or anywhere else.  He has verbalized understanding and 

agreement with the plan.


.


addendum:


Patient's was held till the next day because of continued episodes of low-grade 

temp and of which exceeded 100.4.  Patient has had only one episode so far in 

the last 12 hours and that was 99.5.  Patient states he had multiple blankets 

on when temperature was taken.  Patient feels he will do well at home as his 

sister is a CNA, and a lot of his family members work in healthcare.  I agree 

with this assessment, and if patient remains stable for discharge and continued 

outpatient follow-up.  No further workup or intervention required in my opinion.


.


Home Meds


Active Scripts


Sennosides/Docusate Sodium (Dok Plus Tablet) 1 Each Tablet, 1 TAB PO BID for 14 

Days, TAB


   Prov:WILLIE BAI.         12/15/17


Rivaroxaban* (Xarelto*) 10 Mg Tablet, 10 MG PO WITH DINNER for 7 Days, #7 TAB


   Prov:WILLIE BAI.         12/15/17


Reported Medications


Hydrocodone Bit-Acetaminophen (Hydrocodone Bit-APAP) 5-325MG Tablet, 1 TAB PO 

Q6H Y for PAIN, TAB


   12/10/17


Ibuprofen* (Ibuprofen*) 600 Mg Tablet, 600 MG PO Q6 Y for PAIN, TAB


   12/10/17


Follow-up Plan


Call Dr Bergman's office to confirm your followup appointment





Name, Degree : Brian Bergman MD 


Specialty : Orthopedic Surgery  


Office Address : [facility] [address] [place] [address]. [address] 

[place] [address] 


Office Telephone : [phone] Ext. [phone] 


Office Fax : [phone]





   Also followup with your primary doctor within the next 1-2 weeks.


   If you don't have one please let someone know, we can give you resources 

that may help you pick one.


   You may call Dr Juan Harper's office. he's accepting new patients


   


   Name, Degree: Juan Harper MD Specialty: Internal Medicine Comments: 

Office Address: [address] [place] [address]


   [address]


   [place] [address] Office Telephone: [phone] Office Fax: [phone]


   


   You may also call your insurance company to assign one to you. 


   Review your medication list with your nurse before leaving and if you need 

new prescriptions please let your nurse know.


   I may have made changes to your home medications or given you new 

prescriptions, please let your primary doctor know as well. 


   Stay compliant with your medications and report any side effects to your PCP 

or pharmacist. 


   Return to the ER if you have any concerns and cannot reach your doctors or 

call your insurance company, they usually have a nurse that can help you.


Primary Care Provider


Gabe Flores MD


Time spent on discharge:   > 30 minutes


Pending Labs





Laboratory Tests








Test


  12/15/17


05:37


 


White Blood Count


  9.610^3/ul


(4.8-10.8)


 


Red Blood Count


  4.0910^6/ul


(4.70-6.10)


 


Hemoglobin


  12.1g/dl


(14.0-18.0)


 


Hematocrit


  36.6%


(42.0-52.0)


 


Mean Corpuscular Volume


  89.5fl


(82.0-101.0)


 


Mean Corpuscular Hemoglobin


  29.6pg


(29.0-33.0)


 


Mean Corpuscular Hemoglobin


Concent 33.1g/dl


(32.0-37.0)


 


Red Cell Distribution Width


  12.1%


(11.5-14.5)


 


Platelet Count


  25607^3/UL


(140-415)


 


Mean Platelet Volume


  10.1fl


(7.4-10.4)


 


Neutrophils %


  64.9%


(39.0-77.0)


 


Lymphocytes %


  23.3%


(15.0-51.0)


 


Monocytes %


  9.6%


(0.0-11.0)


 


Eosinophils % 1.6% (0.0-7.0) 


 


Basophils % 0.2% (0.0-2.0) 


 


Nucleated Red Blood Cells %


  0.0/100WBC


(0.0-0.0)


 


Neutrophils #


  6.210^3/ul


(1.6-7.5)


 


Lymphocytes #


  2.210^3/ul


(0.8-2.9)


 


Monocytes #


  0.910^3/ul


(0.3-0.9)


 


Eosinophils #


  0.210^3/ul


(0.0-0.5)


 


Basophils #


  0.010^3/ul


(0.0-0.1)


 


Nucleated Red Blood Cells #


  0.010^3/ul


(0.0-0.0)


 


Sodium Level


  136mmol/L


(135-144)


 


Potassium Level


  3.8mmol/L


(3.5-5.1)


 


Chloride Level


  97mmol/L


()


 


Carbon Dioxide Level


  30mmol/L


(21-31)


 


Anion Gap 13 (8-16) 


 


Blood Urea Nitrogen 9mg/dl (7-20) 


 


Creatinine


  0.84mg/dl


(0.61-1.24)


 


Glucose Level


  106mg/dl


()


 


Calcium Level


  9.0mg/dl


(8.4-10.2)


 


Magnesium Level


  2.1mg/dl


(1.7-2.5)

















WILLIE BAI. Dec 15, 2017 15:09

## 2017-12-26 ENCOUNTER — HOSPITAL ENCOUNTER (EMERGENCY)
Dept: HOSPITAL 10 - FTE | Age: 22
LOS: 1 days | Discharge: HOME | End: 2017-12-27
Payer: COMMERCIAL

## 2017-12-26 VITALS
HEIGHT: 69 IN | HEIGHT: 69 IN | WEIGHT: 220.46 LBS | BODY MASS INDEX: 32.65 KG/M2 | WEIGHT: 220.46 LBS | BODY MASS INDEX: 32.65 KG/M2

## 2017-12-26 DIAGNOSIS — R11.10: ICD-10-CM

## 2017-12-26 DIAGNOSIS — R10.13: Primary | ICD-10-CM

## 2017-12-26 PROCEDURE — 81001 URINALYSIS AUTO W/SCOPE: CPT

## 2017-12-26 PROCEDURE — 83690 ASSAY OF LIPASE: CPT

## 2017-12-26 PROCEDURE — 80053 COMPREHEN METABOLIC PANEL: CPT

## 2017-12-26 PROCEDURE — 85025 COMPLETE CBC W/AUTO DIFF WBC: CPT

## 2017-12-26 PROCEDURE — 74176 CT ABD & PELVIS W/O CONTRAST: CPT

## 2017-12-27 VITALS
DIASTOLIC BLOOD PRESSURE: 92 MMHG | SYSTOLIC BLOOD PRESSURE: 133 MMHG | HEART RATE: 93 BPM | TEMPERATURE: 98.2 F | RESPIRATION RATE: 20 BRPM

## 2017-12-27 LAB
ADD UMIC: YES
ALBUMIN SERPL-MCNC: 5 G/DL (ref 3.3–4.9)
ALBUMIN/GLOB SERPL: 1.42 {RATIO}
ALP SERPL-CCNC: 93 IU/L (ref 42–121)
ALT SERPL-CCNC: 39 IU/L (ref 13–69)
ANION GAP SERPL CALC-SCNC: 18 MMOL/L (ref 8–16)
AST SERPL-CCNC: 21 IU/L (ref 15–46)
BASOPHILS # BLD AUTO: 0 10^3/UL (ref 0–0.1)
BASOPHILS NFR BLD: 0.2 % (ref 0–2)
BILIRUB DIRECT SERPL-MCNC: 0 MG/DL (ref 0–0.2)
BILIRUB SERPL-MCNC: 0.2 MG/DL (ref 0.2–1.3)
BUN SERPL-MCNC: 8 MG/DL (ref 7–20)
CALCIUM SERPL-MCNC: 9.7 MG/DL (ref 8.4–10.2)
CHLORIDE SERPL-SCNC: 109 MMOL/L (ref 97–110)
CO2 SERPL-SCNC: 23 MMOL/L (ref 21–31)
COLOR UR: (no result)
CREAT SERPL-MCNC: 0.72 MG/DL (ref 0.61–1.24)
EOSINOPHIL # BLD: 0 10^3/UL (ref 0–0.5)
EOSINOPHIL NFR BLD: 0.1 % (ref 0–7)
ERYTHROCYTE [DISTWIDTH] IN BLOOD BY AUTOMATED COUNT: 12.2 % (ref 11.5–14.5)
GLOBULIN SER-MCNC: 3.5 G/DL (ref 1.3–3.2)
GLUCOSE SERPL-MCNC: 119 MG/DL (ref 70–220)
GLUCOSE UR STRIP-MCNC: NEGATIVE MG/DL
HCT VFR BLD CALC: 39.1 % (ref 42–52)
HGB BLD-MCNC: 12.8 G/DL (ref 14–18)
KETONES UR STRIP.AUTO-MCNC: (no result) MG/DL
LYMPHOCYTES # BLD AUTO: 1.9 10^3/UL (ref 0.8–2.9)
LYMPHOCYTES NFR BLD AUTO: 10 % (ref 15–51)
MCH RBC QN AUTO: 29 PG (ref 29–33)
MCHC RBC AUTO-ENTMCNC: 32.7 G/DL (ref 32–37)
MCV RBC AUTO: 88.5 FL (ref 82–101)
MONOCYTES # BLD: 0.6 10^3/UL (ref 0.3–0.9)
MONOCYTES NFR BLD: 2.9 % (ref 0–11)
MUCOUS THREADS #/AREA URNS HPF: (no result) /HPF
NEUTROPHILS # BLD: 16.2 10^3/UL (ref 1.6–7.5)
NEUTROPHILS NFR BLD AUTO: 86.3 % (ref 39–77)
NITRITE UR QL STRIP.AUTO: NEGATIVE MG/DL
NRBC # BLD MANUAL: 0 10^3/UL (ref 0–0)
NRBC BLD AUTO-RTO: 0 /100WBC (ref 0–0)
PLATELET # BLD: 562 10^3/UL (ref 140–415)
PMV BLD AUTO: 9.7 FL (ref 7.4–10.4)
POTASSIUM SERPL-SCNC: 3.9 MMOL/L (ref 3.5–5.1)
PROT SERPL-MCNC: 8.5 G/DL (ref 6.1–8.1)
RBC # BLD AUTO: 4.42 10^6/UL (ref 4.7–6.1)
RBC # UR AUTO: NEGATIVE MG/DL
SODIUM SERPL-SCNC: 146 MMOL/L (ref 135–144)
UR ASCORBIC ACID: NEGATIVE MG/DL
UR BILIRUBIN (DIP): (no result) MG/DL
UR CLARITY: (no result)
UR PH (DIP): 6 (ref 5–9)
UR RBC: 2 /HPF (ref 0–5)
UR SPECIFIC GRAVITY (DIP): 1.03 (ref 1–1.03)
UR TOTAL PROTEIN (DIP): (no result) MG/DL
UROBILINOGEN UR STRIP-ACNC: (no result) MG/DL
WBC # BLD AUTO: 18.8 10^3/UL (ref 4.8–10.8)
WBC # UR STRIP: NEGATIVE LEU/UL

## 2017-12-27 NOTE — ERD
ER Documentation


Chief Complaint


Chief Complaint


AP x4 hrs PTA. +n/v. last bm today. pt taking [place] s/p Lt ankle sx





HPI


22-year-old male presents here to emergency department for complaints of mid 

abdominal pain that started 4 hours prior to arrival.  Patient describes the 

pain as throbbing pain, 6/10 scale, accompanied with vomiting, vomited 4 times.

  Patient is vomiting blood in the vomit.  Patient is vomiting blood in the 

stool or black stool.  Patient is vomiting diarrhea or constipation.  Patient 

denies any fever or chills.





ROS


All systems reviewed and are negative except as per history of present illness.





Medications


Home Meds


Active Scripts


Amoxicillin/Potassium Clav (Amox-Clav 875-125 mg Tablet) 875-125 mg Tab, 1 TAB 

PO BID for 6 Days, TAB


   Prov:WILLIE BAI.         12/15/17


Sennosides/Docusate Sodium (Dok Plus Tablet) 1 Each Tablet, 1 TAB PO BID for 14 

Days, TAB


   Prov:WILLIE BAI         12/15/17


Rivaroxaban* (Xarelto*) 10 Mg Tablet, 10 MG PO WITH DINNER for 7 Days, #7 TAB


   Prov:WILLIE BAI.         12/15/17


Reported Medications


Hydrocodone Bit-Acetaminophen (Hydrocodone Bit-APAP) 5-325MG Tablet, 1 TAB PO 

Q6H Y for PAIN, TAB


   12/10/17


Ibuprofen* (Ibuprofen*) 600 Mg Tablet, 600 MG PO Q6 Y for PAIN, TAB


   12/10/17





Allergies


Allergies:  


Coded Allergies:  


     No Known Allergy (Unverified , 12/26/17)





PMhx/Soc


Medical and Surgical Hx:  pt denies Medical Hx


History of Surgery:  Yes (right foot)


Anesthesia Reaction:  No


Hx Neurological Disorder:  No


Hx Respiratory Disorders:  No


Hx Cardiac Disorders:  No


Hx Psychiatric Problems:  No


Hx Miscellaneous Medical Probl:  No


Hx Alcohol Use:  No


Hx Substance Use:  No


Hx Tobacco Use:  No


Smoking Status:  Never smoker





FmHx


Family History:  No coronary disease, No diabetes, No other





Physical Exam


Vitals





Vital Signs








  Date Time  Temp Pulse Resp B/P Pulse Ox O2 Delivery O2 Flow Rate FiO2


 


12/26/17 22:16 97.3 71 18 130/88 100   








Physical Exam


GENERAL:  The patient is well developed and appropriate for usual state of 

health, in no apparent distress.


CHEST:  Clear to auscultation bilaterally. There are no rales, wheezes or 

rhonchi. 


HEART:  Regular rate and rhythm. No murmurs, clicks, rubs or gallops. No S3 or 

S4.


ABDOMEN:  Soft, nontender and nondistended. Good bowel sounds. No rebound or 

guarding. No gross peritonitis. No gross organomegaly or masses. No Chandler sign 

or McBurney point tenderness.


BACK:  No midline or flank tenderness.


EXTREMITIES:  Equal pulses bilaterally. There is no peripheral clubbing, 

cyanosis or edema. No focal swelling or erythema. Full range of motion. Grossly 

neurovascularly intact.


NEURO:  Alert and oriented. Cranial nerves 2-12 intact. Motor strength in all 4 

extremities with 5/5 strength.  Sensation grossly intact. Normal speech and 

gait. 


SKIN:  There is no apparent rash or petechia. The skin is warm and dry.


HEMATOLOGIC AND LYMPHATIC:  There is no evidence of excessive bruising or 

lymphedema. No gross cervical, axillary, or inguinal lymphadenopathy.


Result Diagram:  


12/27/17 0234                                                                  

              12/27/17 0234





Results 24 hrs





 Laboratory Tests








Test


  12/27/17


02:08 12/27/17


02:34


 


Urine Color SANTIAGO  


 


Urine Clarity


  SLIGHTLY


CLOUDY 


 


 


Urine pH 6.0  


 


Urine Specific Gravity 1.033  


 


Urine Ketones TRACEmg/dL  


 


Urine Nitrite NEGATIVEmg/dL  


 


Urine Bilirubin 1+mg/dL  


 


Urine Urobilinogen 1+mg/dL  


 


Urine Leukocyte Esterase NEGATIVELeu/ul  


 


Urine Microscopic RBC 2/HPF  


 


Urine Microscopic WBC 1/HPF  


 


Urine Mucus MANY/HPF  


 


Urine Hemoglobin NEGATIVEmg/dL  


 


Urine Glucose NEGATIVEmg/dL  


 


Urine Total Protein 1+mg/dl  


 


White Blood Count  18.810^3/ul 


 


Red Blood Count  4.4210^6/ul 


 


Hemoglobin  12.8g/dl 


 


Hematocrit  39.1% 


 


Mean Corpuscular Volume  88.5fl 


 


Mean Corpuscular Hemoglobin  29.0pg 


 


Mean Corpuscular Hemoglobin


Concent 


  32.7g/dl 


 


 


Red Cell Distribution Width  12.2% 


 


Platelet Count  88563^3/UL 


 


Mean Platelet Volume  9.7fl 


 


Neutrophils %  86.3% 


 


Lymphocytes %  10.0% 


 


Monocytes %  2.9% 


 


Eosinophils %  0.1% 


 


Basophils %  0.2% 


 


Nucleated Red Blood Cells %  0.0/100WBC 


 


Neutrophils #  16.210^3/ul 


 


Lymphocytes #  1.910^3/ul 


 


Monocytes #  0.610^3/ul 


 


Eosinophils #  0.010^3/ul 


 


Basophils #  0.010^3/ul 


 


Nucleated Red Blood Cells #  0.010^3/ul 


 


Sodium Level  146mmol/L 


 


Potassium Level  3.9mmol/L 


 


Chloride Level  109mmol/L 


 


Carbon Dioxide Level  23mmol/L 


 


Anion Gap  18 


 


Blood Urea Nitrogen  8mg/dl 


 


Creatinine  0.72mg/dl 


 


Glucose Level  119mg/dl 


 


Calcium Level  9.7mg/dl 


 


Total Bilirubin  0.2mg/dl 


 


Direct Bilirubin  0.00mg/dl 


 


Indirect Bilirubin  0.2mg/dl 


 


Aspartate Amino Transf


(AST/SGOT) 


  21IU/L 


 


 


Alanine Aminotransferase


(ALT/SGPT) 


  39IU/L 


 


 


Alkaline Phosphatase  93IU/L 


 


Total Protein  8.5g/dl 


 


Albumin  5.0g/dl 


 


Globulin  3.50g/dl 


 


Albumin/Globulin Ratio  1.42 


 


Lipase  75U/L 








 Current Medications








 Medications


  (Trade)  Dose


 Ordered  Sig/Sp


 Route


 PRN Reason  Start Time


 Stop Time Status Last Admin


Dose Admin


 


 Ondansetron HCl


  (Zofran Odt)  4 mg  ONCE  STAT


 ODT


   12/27/17 01:53


 12/27/17 01:54 DC 12/27/17 02:39


 





Patient was given Zofran here in the emergency department. After treatment, 

patient was able to tolerate po fluids here in the emergency department without 

any vomiting. There is no signs and symptoms of dehydration. 


PROCEDURE:    CT ABDOMEN/PELVIS WITHOUT CONTRAST  


 


CLINICAL INDICATION:   22-year-old male with abdominal pain. 


 


TECHNIQUE:   The study was performed utilizing a GE LightSpeed VCT 64-slice CT 

scanner.  Direct axial sections were obtained through the abdomen and pelvis 

without the use of intravenous contrast material. Sagittal and coronal 

reformations were obtained. One or more of the following dose reduction 

techniques were utilized: automated exposure control, adjustment of the mA and/

or kV according to patient's size and/or the use of iterative reconstruction 

technique. DICOM images are available. The images were reviewed on a PACS 

workstation.   CTD/vol = 22.9 mGy; Total Exam DLP = 1621.0 mGy-cm. 


 


COMPARISON:    None. 


 


FINDINGS:


 


The lung bases are unremarkable.  There is no evidence for significant pleural 

effusion.  The liver has a normal size and contour without focal areas of 

abnormal density. No intrahepatic nor extrahepatic biliary ductal dilatation is 

seen. The gallbladder demonstrates no wall thickening nor pericholecystic 

fluid. No biliary stones are evident. The pancreas is without areas of abnormal 

attenuation.  The spleen is identified and has a normal size without abnormal 

density. The adrenal glands are unremarkable. The kidneys are without abnormal 

density. No hydroureteronephrosis nor nephroureterolithiasis is evident. The 

urinary bladder is decompressed. There is mild retained stool identified within 

the ascending and transverse colon without obstruction.  The appendix is 

diminutive and is without abnormal thickening or surrounding inflammatory 

reaction. The aortoiliac vessels are without aneurysmal dilatation. The osseous 

structures are intact. 


 


IMPRESSION:


 


1.  Mild retained stool within the proximal colon without obstruction.


2.  No CT evidence for appendicitis.


_____________________________________________ 


.Roly Herring MD, MD           Date    Time 


Electronically viewed and signed by .Roly Herring MD, MD on 12/27/2017 03:29 


 


D:  12/27/2017 03:29  T:  12/27/2017 03:29


.M/





CC: DIXIE KRUSE NP





Procedures/MDM


Medical Decision Making: Patient symptoms of vomiting abdominal pain epigastric 

pain nonspecific at this time, possible viral illness.  Patient has elevated 

WBC but most likely is consistent with stress reaction from excessive vomiting.

  If these are normal, no suspicion for biliary colic/biliary infection, 

cholecystitis, acute pancreatitis, lipase is normal.  Patient does not have any 

vomiting at this time.  There is low suspicion for abdominal emergencies at 

this time. Patients abdominal exam is normal at this time. Patients radiology 

exam does not show any abdominal emergencies at this time. There is low 

suspicion for appendicitis, cholecystitis, abdominal aortic aneurysms or 

peritonitis at this time.  There is low suspicion for sepsis. Patient appears 

well and is hemodynamically stable.





Disposition: Home. Condition: Stable


Prescription Zofran Pepcid


Instructions: Patient is advised to take medications as prescribed. Patient is 

advised to rest, increase fluid intake and do brat diet for next 1-2 days and 

progress as tolerated. Patient is advised that if symptoms are worse, severe 

abdominal pain, uncontrolled vomiting, high fever, severe flank pain, worst 

signs and symptoms, to return to the emergency department immediately.  

Otherwise, patient can follow up with primary care doctor/ in the emergency 

department 8 hours for reevaluation of symptoms








Disclaimer: Inadvertent spelling and grammatical errors are likely due to EHR/

dictation software use and do not reflect on the overall quality of patient 

care. Also, please note that the electronic time recorded on this note does not 

necessarily reflect the actual time of the patient encounter.





Departure


Diagnosis:  


 Primary Impression:  


 Abdominal pain


 Abdominal location:  epigastric  Qualified Code:  R10.13 - Epigastric pain


 Additional Impression:  


 Vomiting


 Vomiting type:  unspecified  Vomiting Intractability:  unspecified  Nausea 

presence:  unspecified  Qualified Code:  R11.10 - Vomiting, intractability of 

vomiting not specified, presence of nausea not specified, unspecified vomiting 

type


Condition:  Stable


Patient Instructions:  Abdominal Pain, Vomiting (6Y-Adult)





Additional Instructions:  


Patient is advised to take medications as prescribed. Patient is advised to rest

, increase fluid intake and do brat diet for next 1-2 days and progress as 

tolerated. Patient is advised that if symptoms are worse, severe abdominal pain

, uncontrolled vomiting, high fever, severe flank pain, worst signs and symptoms

, to return to the emergency department immediately.  Otherwise, patient can 

follow up with primary care doctor/ in the emergency department 8 hours for 

reevaluation of symptoms











DIXIE KRUSE NP Dec 27, 2017 01:58

## 2018-01-06 ENCOUNTER — HOSPITAL ENCOUNTER (INPATIENT)
Age: 23
LOS: 4 days | Discharge: HOME | DRG: 419 | End: 2018-01-10

## 2018-01-06 ENCOUNTER — HOSPITAL ENCOUNTER (INPATIENT)
Dept: HOSPITAL 91 - MS1 | Age: 23
LOS: 4 days | Discharge: HOME | DRG: 419 | End: 2018-01-10
Payer: COMMERCIAL

## 2018-01-06 DIAGNOSIS — K81.0: Primary | ICD-10-CM

## 2018-01-06 DIAGNOSIS — E66.9: ICD-10-CM

## 2018-01-06 DIAGNOSIS — S82.302D: ICD-10-CM
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
## 2018-01-06 LAB
FREE T4 (FREE THYROXINE): 1.47 NG/DL (ref 0.79–2.35)
INR: 1.14
PARTIAL THROMBOPLASTIN TIME: 34.1 SEC (ref 25–35)
PROTIME: 14.8 SEC (ref 11.9–14.9)
PT RATIO: 1.2

## 2018-01-06 PROCEDURE — 85610 PROTHROMBIN TIME: CPT

## 2018-01-06 PROCEDURE — 83735 ASSAY OF MAGNESIUM: CPT

## 2018-01-06 PROCEDURE — 80048 BASIC METABOLIC PNL TOTAL CA: CPT

## 2018-01-06 PROCEDURE — 85730 THROMBOPLASTIN TIME PARTIAL: CPT

## 2018-01-06 PROCEDURE — 84443 ASSAY THYROID STIM HORMONE: CPT

## 2018-01-06 PROCEDURE — 80061 LIPID PANEL: CPT

## 2018-01-06 PROCEDURE — 84100 ASSAY OF PHOSPHORUS: CPT

## 2018-01-06 PROCEDURE — 84439 ASSAY OF FREE THYROXINE: CPT

## 2018-01-06 PROCEDURE — 88304 TISSUE EXAM BY PATHOLOGIST: CPT

## 2018-01-06 PROCEDURE — 83036 HEMOGLOBIN GLYCOSYLATED A1C: CPT

## 2018-01-06 PROCEDURE — 85025 COMPLETE CBC W/AUTO DIFF WBC: CPT

## 2018-01-06 RX ADMIN — HEPARIN SODIUM 1 UNIT: 5000 INJECTION, SOLUTION INTRAVENOUS; SUBCUTANEOUS at 21:00

## 2018-01-06 RX ADMIN — MORPHINE SULFATE 1 MG: 10 SOLUTION ORAL at 20:49

## 2018-01-06 RX ADMIN — CEFTRIAXONE 1 MLS/HR: 1 INJECTION, SOLUTION INTRAVENOUS at 14:40

## 2018-01-06 RX ADMIN — LORAZEPAM 1 MG: 0.5 TABLET ORAL at 21:44

## 2018-01-06 RX ADMIN — THIAMINE HYDROCHLORIDE 1 MLS/HR: 100 INJECTION, SOLUTION INTRAMUSCULAR; INTRAVENOUS at 14:41

## 2018-01-06 RX ADMIN — THIAMINE HYDROCHLORIDE 1 MLS/HR: 100 INJECTION, SOLUTION INTRAMUSCULAR; INTRAVENOUS at 23:16

## 2018-01-06 RX ADMIN — ACETAMINOPHEN 1 MG: 325 TABLET, FILM COATED ORAL at 21:44

## 2018-01-06 RX ADMIN — DOCUSATE SODIUM 1 MG: 100 CAPSULE, LIQUID FILLED ORAL at 15:55

## 2018-01-06 RX ADMIN — MAGNESIUM HYDROXIDE 1 ML: 400 SUSPENSION ORAL at 15:55

## 2018-01-07 LAB
ADD MAN DIFF?: NO
ANION GAP: 19 (ref 8–16)
BASOPHIL #: 0 10^3/UL (ref 0–0.1)
BASOPHILS %: 0.4 % (ref 0–2)
BLOOD UREA NITROGEN: 4 MG/DL (ref 7–20)
CALCIUM: 8.5 MG/DL (ref 8.4–10.2)
CARBON DIOXIDE: 26 MMOL/L (ref 21–31)
CHLORIDE: 103 MMOL/L (ref 97–110)
CHOL/HDL RATIO: 3.2 RATIO
CHOLESTEROL: 110 MG/DL (ref 100–200)
CREATININE: 0.71 MG/DL (ref 0.61–1.24)
EOSINOPHILS #: 0.1 10^3/UL (ref 0–0.5)
EOSINOPHILS %: 0.9 % (ref 0–7)
GLUCOSE: 91 MG/DL (ref 70–220)
HDL CHOLESTEROL: 34 MG/DL (ref 30–63)
HEMATOCRIT: 36.5 % (ref 42–52)
HEMOGLOBIN A1C: 5.4 % (ref 0–5.9)
HEMOGLOBIN: 11.6 G/DL (ref 14–18)
LDL CHOLESTEROL,CALCULATED: 62 MG/DL
LYMPHOCYTES #: 2.7 10^3/UL (ref 0.8–2.9)
LYMPHOCYTES %: 28.3 % (ref 15–51)
MAGNESIUM: 2 MG/DL (ref 1.7–2.5)
MEAN CORPUSCULAR HEMOGLOBIN: 28.9 PG (ref 29–33)
MEAN CORPUSCULAR HGB CONC: 31.8 G/DL (ref 32–37)
MEAN CORPUSCULAR VOLUME: 90.8 FL (ref 82–101)
MEAN PLATELET VOLUME: 10.7 FL (ref 7.4–10.4)
MONOCYTE #: 1 10^3/UL (ref 0.3–0.9)
MONOCYTES %: 10.8 % (ref 0–11)
NEUTROPHIL #: 5.6 10^3/UL (ref 1.6–7.5)
NEUTROPHILS %: 59.2 % (ref 39–77)
NUCLEATED RED BLOOD CELLS #: 0 10^3/UL (ref 0–0)
NUCLEATED RED BLOOD CELLS%: 0 /100WBC (ref 0–0)
PHOSPHORUS: 4.2 MG/DL (ref 2.5–4.9)
PLATELET COUNT: 342 10^3/UL (ref 140–415)
POTASSIUM: 3.6 MMOL/L (ref 3.5–5.1)
RED BLOOD COUNT: 4.02 10^6/UL (ref 4.7–6.1)
RED CELL DISTRIBUTION WIDTH: 12.9 % (ref 11.5–14.5)
SODIUM: 144 MMOL/L (ref 135–144)
THYROID STIMULATING HORMONE: 2.39 MIU/L (ref 0.47–4.68)
TRIGLYCERIDES: 71 MG/DL (ref 0–149)
WHITE BLOOD COUNT: 9.4 10^3/UL (ref 4.8–10.8)

## 2018-01-07 RX ADMIN — THIAMINE HYDROCHLORIDE 1 MLS/HR: 100 INJECTION, SOLUTION INTRAMUSCULAR; INTRAVENOUS at 18:09

## 2018-01-07 RX ADMIN — HYDROCODONE BITARTRATE AND ACETAMINOPHEN 1 TAB: 5; 325 TABLET ORAL at 07:30

## 2018-01-07 RX ADMIN — HEPARIN SODIUM 1 UNIT: 5000 INJECTION, SOLUTION INTRAVENOUS; SUBCUTANEOUS at 20:19

## 2018-01-07 RX ADMIN — MAGNESIUM HYDROXIDE 1 ML: 400 SUSPENSION ORAL at 20:22

## 2018-01-07 RX ADMIN — THIAMINE HYDROCHLORIDE 1 MLS/HR: 100 INJECTION, SOLUTION INTRAMUSCULAR; INTRAVENOUS at 03:06

## 2018-01-07 RX ADMIN — PANTOPRAZOLE SODIUM 1 MG: 40 TABLET, DELAYED RELEASE ORAL at 05:05

## 2018-01-07 RX ADMIN — HYDROCODONE BITARTRATE AND ACETAMINOPHEN 1 TAB: 5; 325 TABLET ORAL at 00:48

## 2018-01-07 RX ADMIN — POLYETHYLENE GLYCOL 3350 1 GM: 17 POWDER, FOR SOLUTION ORAL at 16:19

## 2018-01-07 RX ADMIN — THIAMINE HYDROCHLORIDE 1 MLS/HR: 100 INJECTION, SOLUTION INTRAMUSCULAR; INTRAVENOUS at 09:16

## 2018-01-07 RX ADMIN — HYDROCODONE BITARTRATE AND ACETAMINOPHEN 1 TAB: 5; 325 TABLET ORAL at 14:35

## 2018-01-07 RX ADMIN — HEPARIN SODIUM 1 UNIT: 5000 INJECTION, SOLUTION INTRAVENOUS; SUBCUTANEOUS at 09:15

## 2018-01-07 RX ADMIN — CEFTRIAXONE 1 MLS/HR: 1 INJECTION, SOLUTION INTRAVENOUS at 14:18

## 2018-01-07 RX ADMIN — HYDROCODONE BITARTRATE AND ACETAMINOPHEN 1 TAB: 5; 325 TABLET ORAL at 20:20

## 2018-01-08 LAB
ADD MAN DIFF?: NO
ANION GAP: 16 (ref 8–16)
BASOPHIL #: 0 10^3/UL (ref 0–0.1)
BASOPHILS %: 0.4 % (ref 0–2)
BLOOD UREA NITROGEN: 3 MG/DL (ref 7–20)
CALCIUM: 8.9 MG/DL (ref 8.4–10.2)
CARBON DIOXIDE: 30 MMOL/L (ref 21–31)
CHLORIDE: 102 MMOL/L (ref 97–110)
CREATININE: 0.74 MG/DL (ref 0.61–1.24)
EOSINOPHILS #: 0.2 10^3/UL (ref 0–0.5)
EOSINOPHILS %: 1.9 % (ref 0–7)
GLUCOSE: 94 MG/DL (ref 70–220)
HEMATOCRIT: 36.2 % (ref 42–52)
HEMOGLOBIN: 11.5 G/DL (ref 14–18)
LYMPHOCYTES #: 1.9 10^3/UL (ref 0.8–2.9)
LYMPHOCYTES %: 21.7 % (ref 15–51)
MEAN CORPUSCULAR HEMOGLOBIN: 29 PG (ref 29–33)
MEAN CORPUSCULAR HGB CONC: 31.8 G/DL (ref 32–37)
MEAN CORPUSCULAR VOLUME: 91.2 FL (ref 82–101)
MEAN PLATELET VOLUME: 10.5 FL (ref 7.4–10.4)
MONOCYTE #: 0.9 10^3/UL (ref 0.3–0.9)
MONOCYTES %: 10.7 % (ref 0–11)
NEUTROPHIL #: 5.6 10^3/UL (ref 1.6–7.5)
NEUTROPHILS %: 65.1 % (ref 39–77)
NUCLEATED RED BLOOD CELLS #: 0 10^3/UL (ref 0–0)
NUCLEATED RED BLOOD CELLS%: 0 /100WBC (ref 0–0)
PLATELET COUNT: 358 10^3/UL (ref 140–415)
POTASSIUM: 4 MMOL/L (ref 3.5–5.1)
RED BLOOD COUNT: 3.97 10^6/UL (ref 4.7–6.1)
RED CELL DISTRIBUTION WIDTH: 12.6 % (ref 11.5–14.5)
SODIUM: 144 MMOL/L (ref 135–144)
WHITE BLOOD COUNT: 8.5 10^3/UL (ref 4.8–10.8)

## 2018-01-08 RX ADMIN — PANTOPRAZOLE SODIUM 1 MG: 40 TABLET, DELAYED RELEASE ORAL at 05:13

## 2018-01-08 RX ADMIN — HEPARIN SODIUM 1 UNIT: 5000 INJECTION, SOLUTION INTRAVENOUS; SUBCUTANEOUS at 08:34

## 2018-01-08 RX ADMIN — HYDROCODONE BITARTRATE AND ACETAMINOPHEN 1 TAB: 5; 325 TABLET ORAL at 02:08

## 2018-01-08 RX ADMIN — POLYETHYLENE GLYCOL 3350 1 GM: 17 POWDER, FOR SOLUTION ORAL at 08:33

## 2018-01-08 RX ADMIN — HYDROCODONE BITARTRATE AND ACETAMINOPHEN 1 TAB: 5; 325 TABLET ORAL at 08:33

## 2018-01-08 RX ADMIN — THIAMINE HYDROCHLORIDE 1 MLS/HR: 100 INJECTION, SOLUTION INTRAMUSCULAR; INTRAVENOUS at 05:14

## 2018-01-08 RX ADMIN — MORPHINE SULFATE 1 MG: 2 INJECTION, SOLUTION INTRAMUSCULAR; INTRAVENOUS at 12:37

## 2018-01-08 RX ADMIN — THIAMINE HYDROCHLORIDE 1 MLS/HR: 100 INJECTION, SOLUTION INTRAMUSCULAR; INTRAVENOUS at 21:12

## 2018-01-08 RX ADMIN — MORPHINE SULFATE 1 MG: 2 INJECTION, SOLUTION INTRAMUSCULAR; INTRAVENOUS at 21:17

## 2018-01-08 RX ADMIN — CEFTRIAXONE 1 MLS/HR: 1 INJECTION, SOLUTION INTRAVENOUS at 12:37

## 2018-01-08 RX ADMIN — HYDROCODONE BITARTRATE AND ACETAMINOPHEN 1 TAB: 5; 325 TABLET ORAL at 19:59

## 2018-01-08 RX ADMIN — HEPARIN SODIUM 1 UNIT: 5000 INJECTION, SOLUTION INTRAVENOUS; SUBCUTANEOUS at 20:51

## 2018-01-08 RX ADMIN — THIAMINE HYDROCHLORIDE 1 MLS/HR: 100 INJECTION, SOLUTION INTRAMUSCULAR; INTRAVENOUS at 15:16

## 2018-01-09 LAB
ADD MAN DIFF?: NO
ANION GAP: 15 (ref 8–16)
BASOPHIL #: 0 10^3/UL (ref 0–0.1)
BASOPHILS %: 0.5 % (ref 0–2)
BLOOD UREA NITROGEN: 2 MG/DL (ref 7–20)
CALCIUM: 8.8 MG/DL (ref 8.4–10.2)
CARBON DIOXIDE: 28 MMOL/L (ref 21–31)
CHLORIDE: 104 MMOL/L (ref 97–110)
CREATININE: 0.74 MG/DL (ref 0.61–1.24)
EOSINOPHILS #: 0.3 10^3/UL (ref 0–0.5)
EOSINOPHILS %: 3.2 % (ref 0–7)
GLUCOSE: 89 MG/DL (ref 70–220)
HEMATOCRIT: 36 % (ref 42–52)
HEMOGLOBIN: 11.6 G/DL (ref 14–18)
LYMPHOCYTES #: 2.1 10^3/UL (ref 0.8–2.9)
LYMPHOCYTES %: 25.4 % (ref 15–51)
MEAN CORPUSCULAR HEMOGLOBIN: 29.2 PG (ref 29–33)
MEAN CORPUSCULAR HGB CONC: 32.2 G/DL (ref 32–37)
MEAN CORPUSCULAR VOLUME: 90.7 FL (ref 82–101)
MEAN PLATELET VOLUME: 10.5 FL (ref 7.4–10.4)
MONOCYTE #: 0.8 10^3/UL (ref 0.3–0.9)
MONOCYTES %: 9 % (ref 0–11)
NEUTROPHIL #: 5.1 10^3/UL (ref 1.6–7.5)
NEUTROPHILS %: 61.5 % (ref 39–77)
NUCLEATED RED BLOOD CELLS #: 0 10^3/UL (ref 0–0)
NUCLEATED RED BLOOD CELLS%: 0 /100WBC (ref 0–0)
PLATELET COUNT: 400 10^3/UL (ref 140–415)
POTASSIUM: 3.9 MMOL/L (ref 3.5–5.1)
RED BLOOD COUNT: 3.97 10^6/UL (ref 4.7–6.1)
RED CELL DISTRIBUTION WIDTH: 12.7 % (ref 11.5–14.5)
SODIUM: 143 MMOL/L (ref 135–144)
WHITE BLOOD COUNT: 8.3 10^3/UL (ref 4.8–10.8)

## 2018-01-09 PROCEDURE — 0FT44ZZ RESECTION OF GALLBLADDER, PERCUTANEOUS ENDOSCOPIC APPROACH: ICD-10-PCS

## 2018-01-09 RX ADMIN — FENTANYL CITRATE 1 MCG: 50 INJECTION, SOLUTION INTRAMUSCULAR; INTRAVENOUS at 14:26

## 2018-01-09 RX ADMIN — PANTOPRAZOLE SODIUM 1 MG: 40 TABLET, DELAYED RELEASE ORAL at 06:00

## 2018-01-09 RX ADMIN — POLYETHYLENE GLYCOL 3350 1 GM: 17 POWDER, FOR SOLUTION ORAL at 08:23

## 2018-01-09 RX ADMIN — MORPHINE SULFATE 1 MG: 2 INJECTION, SOLUTION INTRAMUSCULAR; INTRAVENOUS at 02:37

## 2018-01-09 RX ADMIN — KETOROLAC TROMETHAMINE 1 MG: 15 INJECTION, SOLUTION INTRAMUSCULAR; INTRAVENOUS at 19:55

## 2018-01-09 RX ADMIN — MORPHINE SULFATE 1 MG: 2 INJECTION, SOLUTION INTRAMUSCULAR; INTRAVENOUS at 05:35

## 2018-01-09 RX ADMIN — MORPHINE SULFATE 1 MG: 2 INJECTION, SOLUTION INTRAMUSCULAR; INTRAVENOUS at 10:51

## 2018-01-09 RX ADMIN — ONDANSETRON HYDROCHLORIDE 1 MG: 2 INJECTION, SOLUTION INTRAMUSCULAR; INTRAVENOUS at 14:05

## 2018-01-09 RX ADMIN — MEPERIDINE HYDROCHLORIDE 1 MG: 25 INJECTION, SOLUTION INTRAMUSCULAR; INTRAVENOUS; SUBCUTANEOUS at 14:24

## 2018-01-09 RX ADMIN — KETOROLAC TROMETHAMINE 1 MG: 15 INJECTION, SOLUTION INTRAMUSCULAR; INTRAVENOUS at 15:52

## 2018-01-09 RX ADMIN — ASCORBIC ACID, VITAMIN A PALMITATE, CHOLECALCIFEROL, THIAMINE HYDROCHLORIDE, RIBOFLAVIN-5 PHOSPHATE SODIUM, PYRIDOXINE HYDROCHLORIDE, NIACINAMIDE, DEXPANTHENOL, ALPHA-TOCOPHEROL ACETATE, VITAMIN K1, FOLIC ACID, BIOTIN, CYANOCOBALAMIN 1 MLS/HR: 200; 3300; 200; 6; 3.6; 6; 40; 15; 10; 150; 600; 60; 5 INJECTION, SOLUTION INTRAVENOUS at 23:53

## 2018-01-09 RX ADMIN — FENTANYL CITRATE 1 MCG: 50 INJECTION, SOLUTION INTRAMUSCULAR; INTRAVENOUS at 14:05

## 2018-01-09 RX ADMIN — THIAMINE HYDROCHLORIDE 1 MLS/HR: 100 INJECTION, SOLUTION INTRAMUSCULAR; INTRAVENOUS at 01:16

## 2018-01-09 RX ADMIN — LIDOCAINE HYDROCHLORIDE 1 ML: 10; .005 INJECTION, SOLUTION EPIDURAL; INFILTRATION; INTRACAUDAL; PERINEURAL at 14:06

## 2018-01-09 RX ADMIN — HEPARIN SODIUM 1 UNIT: 5000 INJECTION, SOLUTION INTRAVENOUS; SUBCUTANEOUS at 08:23

## 2018-01-09 RX ADMIN — BUPIVACAINE HYDROCHLORIDE 1 ML: 2.5 INJECTION, SOLUTION EPIDURAL; INFILTRATION; INTRACAUDAL; PERINEURAL at 14:06

## 2018-01-09 RX ADMIN — MORPHINE SULFATE 1 MG: 2 INJECTION, SOLUTION INTRAMUSCULAR; INTRAVENOUS at 08:22

## 2018-01-09 RX ADMIN — ASCORBIC ACID, VITAMIN A PALMITATE, CHOLECALCIFEROL, THIAMINE HYDROCHLORIDE, RIBOFLAVIN-5 PHOSPHATE SODIUM, PYRIDOXINE HYDROCHLORIDE, NIACINAMIDE, DEXPANTHENOL, ALPHA-TOCOPHEROL ACETATE, VITAMIN K1, FOLIC ACID, BIOTIN, CYANOCOBALAMIN 1 MLS/HR: 200; 3300; 200; 6; 3.6; 6; 40; 15; 10; 150; 600; 60; 5 INJECTION, SOLUTION INTRAVENOUS at 15:56

## 2018-01-09 RX ADMIN — PIPERACILLIN SODIUM AND TAZOBACTAM SODIUM 1 MLS/HR: 3; .375 INJECTION, POWDER, LYOPHILIZED, FOR SOLUTION INTRAVENOUS at 18:20

## 2018-01-09 RX ADMIN — THIAMINE HYDROCHLORIDE 1 MLS/HR: 100 INJECTION, SOLUTION INTRAMUSCULAR; INTRAVENOUS at 11:16

## 2018-01-09 RX ADMIN — MORPHINE SULFATE 1 MG: 2 INJECTION, SOLUTION INTRAMUSCULAR; INTRAVENOUS at 17:47

## 2018-01-10 LAB
ADD MAN DIFF?: NO
ANION GAP: 13 (ref 8–16)
BASOPHIL #: 0 10^3/UL (ref 0–0.1)
BASOPHILS %: 0 % (ref 0–2)
BLOOD UREA NITROGEN: 2 MG/DL (ref 7–20)
CALCIUM: 9.1 MG/DL (ref 8.4–10.2)
CARBON DIOXIDE: 26 MMOL/L (ref 21–31)
CHLORIDE: 104 MMOL/L (ref 97–110)
CREATININE: 0.65 MG/DL (ref 0.61–1.24)
EOSINOPHILS #: 0 10^3/UL (ref 0–0.5)
EOSINOPHILS %: 0 % (ref 0–7)
GLUCOSE: 156 MG/DL (ref 70–220)
HEMATOCRIT: 32.3 % (ref 42–52)
HEMOGLOBIN: 10.6 G/DL (ref 14–18)
LYMPHOCYTES #: 0.9 10^3/UL (ref 0.8–2.9)
LYMPHOCYTES %: 7.4 % (ref 15–51)
MEAN CORPUSCULAR HEMOGLOBIN: 29 PG (ref 29–33)
MEAN CORPUSCULAR HGB CONC: 32.8 G/DL (ref 32–37)
MEAN CORPUSCULAR VOLUME: 88.3 FL (ref 82–101)
MEAN PLATELET VOLUME: 10.2 FL (ref 7.4–10.4)
MONOCYTE #: 1 10^3/UL (ref 0.3–0.9)
MONOCYTES %: 8.8 % (ref 0–11)
NEUTROPHIL #: 9.7 10^3/UL (ref 1.6–7.5)
NEUTROPHILS %: 83.5 % (ref 39–77)
NUCLEATED RED BLOOD CELLS #: 0 10^3/UL (ref 0–0)
NUCLEATED RED BLOOD CELLS%: 0 /100WBC (ref 0–0)
PLATELET COUNT: 441 10^3/UL (ref 140–415)
POTASSIUM: 3.9 MMOL/L (ref 3.5–5.1)
RED BLOOD COUNT: 3.66 10^6/UL (ref 4.7–6.1)
RED CELL DISTRIBUTION WIDTH: 12.7 % (ref 11.5–14.5)
SODIUM: 139 MMOL/L (ref 135–144)
WHITE BLOOD COUNT: 11.6 10^3/UL (ref 4.8–10.8)

## 2018-01-10 RX ADMIN — KETOROLAC TROMETHAMINE 1 MG: 15 INJECTION, SOLUTION INTRAMUSCULAR; INTRAVENOUS at 02:35

## 2018-01-10 RX ADMIN — PANTOPRAZOLE SODIUM 1 MG: 40 TABLET, DELAYED RELEASE ORAL at 05:32

## 2018-01-10 RX ADMIN — MORPHINE SULFATE 1 MG: 2 INJECTION, SOLUTION INTRAMUSCULAR; INTRAVENOUS at 00:09

## 2018-01-10 RX ADMIN — ASCORBIC ACID, VITAMIN A PALMITATE, CHOLECALCIFEROL, THIAMINE HYDROCHLORIDE, RIBOFLAVIN-5 PHOSPHATE SODIUM, PYRIDOXINE HYDROCHLORIDE, NIACINAMIDE, DEXPANTHENOL, ALPHA-TOCOPHEROL ACETATE, VITAMIN K1, FOLIC ACID, BIOTIN, CYANOCOBALAMIN 1 MLS/HR: 200; 3300; 200; 6; 3.6; 6; 40; 15; 10; 150; 600; 60; 5 INJECTION, SOLUTION INTRAVENOUS at 09:53

## 2018-01-10 RX ADMIN — KETOROLAC TROMETHAMINE 1 MG: 15 INJECTION, SOLUTION INTRAMUSCULAR; INTRAVENOUS at 08:13

## 2018-01-10 RX ADMIN — POLYETHYLENE GLYCOL 3350 1 GM: 17 POWDER, FOR SOLUTION ORAL at 08:13

## 2018-01-10 RX ADMIN — PIPERACILLIN SODIUM AND TAZOBACTAM SODIUM 1 MLS/HR: 3; .375 INJECTION, POWDER, LYOPHILIZED, FOR SOLUTION INTRAVENOUS at 00:25

## 2018-01-10 RX ADMIN — PIPERACILLIN SODIUM AND TAZOBACTAM SODIUM 1 MLS/HR: 3; .375 INJECTION, POWDER, LYOPHILIZED, FOR SOLUTION INTRAVENOUS at 05:32

## 2018-01-10 RX ADMIN — ENOXAPARIN SODIUM 1 MG: 100 INJECTION SUBCUTANEOUS at 06:45

## 2018-01-10 RX ADMIN — ASCORBIC ACID, VITAMIN A PALMITATE, CHOLECALCIFEROL, THIAMINE HYDROCHLORIDE, RIBOFLAVIN-5 PHOSPHATE SODIUM, PYRIDOXINE HYDROCHLORIDE, NIACINAMIDE, DEXPANTHENOL, ALPHA-TOCOPHEROL ACETATE, VITAMIN K1, FOLIC ACID, BIOTIN, CYANOCOBALAMIN 1 MLS/HR: 200; 3300; 200; 6; 3.6; 6; 40; 15; 10; 150; 600; 60; 5 INJECTION, SOLUTION INTRAVENOUS at 03:17

## 2022-11-01 NOTE — RADRPT
PROCEDURE:   XR Chest. 

 

CLINICAL INDICATION:   Fever 

 

TECHNIQUE:   Single frontal chest x-ray. 

 

COMPARISON:   None. 

 

FINDINGS:

 

No acute infiltrate, pleural effusion or pneumothorax is identified.  Cardiomediastinal silhouette i
s within normal limits.  The osseous structures are unremarkable.   

 

IMPRESSION:

 

1.  No evidence of acute cardiopulmonary process.  

 

RPTAT: QQ

_____________________________________________ 

.Hira Maagllon MD, MD           Date    Time 

Electronically viewed and signed by .Hira Magallon MD, MD on 12/13/2017 08:29 

 

D:  12/13/2017 08:29  T:  12/13/2017 08:29

.R/ Average build